# Patient Record
Sex: MALE | Race: WHITE | Employment: OTHER | URBAN - METROPOLITAN AREA
[De-identification: names, ages, dates, MRNs, and addresses within clinical notes are randomized per-mention and may not be internally consistent; named-entity substitution may affect disease eponyms.]

---

## 2019-08-05 ENCOUNTER — OFFICE VISIT (OUTPATIENT)
Dept: ORTHOPEDIC SURGERY | Age: 59
End: 2019-08-05
Payer: COMMERCIAL

## 2019-08-05 VITALS — WEIGHT: 177 LBS | HEIGHT: 66 IN | BODY MASS INDEX: 28.45 KG/M2

## 2019-08-05 DIAGNOSIS — M22.2X2 PATELLOFEMORAL PAIN SYNDROME OF LEFT KNEE: Primary | ICD-10-CM

## 2019-08-05 DIAGNOSIS — M25.562 LEFT KNEE PAIN, UNSPECIFIED CHRONICITY: ICD-10-CM

## 2019-08-05 PROCEDURE — 99243 OFF/OP CNSLTJ NEW/EST LOW 30: CPT | Performed by: ORTHOPAEDIC SURGERY

## 2019-08-05 PROCEDURE — G8427 DOCREV CUR MEDS BY ELIG CLIN: HCPCS | Performed by: ORTHOPAEDIC SURGERY

## 2019-08-05 PROCEDURE — G8419 CALC BMI OUT NRM PARAM NOF/U: HCPCS | Performed by: ORTHOPAEDIC SURGERY

## 2019-08-05 RX ORDER — METHOCARBAMOL 750 MG/1
750 TABLET, FILM COATED ORAL PRN
COMMUNITY
Start: 2019-07-05 | End: 2022-01-24

## 2019-08-05 RX ORDER — OMEPRAZOLE 20 MG/1
CAPSULE, DELAYED RELEASE ORAL
COMMUNITY
Start: 2019-06-14 | End: 2021-11-18

## 2019-08-05 RX ORDER — MELOXICAM 15 MG/1
TABLET ORAL
Refills: 0 | Status: ON HOLD | COMMUNITY
Start: 2019-05-04 | End: 2021-01-21 | Stop reason: ALTCHOICE

## 2019-08-05 RX ORDER — SODIUM PHOSPHATE,MONO-DIBASIC 19G-7G/118
1 ENEMA (ML) RECTAL
COMMUNITY

## 2021-01-05 NOTE — PROGRESS NOTES
The [facility], [employer] / [facility] ([facility]) [address] [facility] [address] [facility], [facility] [place] [address]    Acknowledgment of Informed Consent for Surgical or Medical Procedure and Sedation  I agree to allow doctor(s) [address] [facility] and his/her associates or assistants, including residents and/or other qualified medical practitioner to perform the following medical treatment or procedure and to administer or direct the administration of sedation as necessary:  Procedure(s): [address] [facility], [address]  My doctor has explained the following regarding the proposed procedure:   the explanation of the procedure   the benefits of the procedure   the potential problems that might occur during recuperation   the risks and side effects of the procedure which could include but are not limited to severe blood loss, infection, stroke or death   the benefits, risks and side effect of alternative procedures including the consequences of declining this procedure or any alternative procedures   the likelihood of achieving satisfactory results. I acknowledge no guarantee or assurance has been made to me regarding the results. I understand that during the course of this treatment/procedure, unforeseen conditions can occur which require an additional or different procedure. I agree to allow my physician or assistants to perform such extension of the original procedure as they may find necessary. I understand that sedation will often result in temporary impairment of memory and fine motor skills and that sedation can occasionally progress to a state of deep sedation or general anesthesia. I understand the risks of anesthesia for surgery include, but are not limited to, sore throat, hoarseness, injury to face, mouth, or teeth; nausea; headache; injury to blood vessels or nerves; death, brain damage, or paralysis.     I understand that if I have a Limitation of Treatment order in effect during my hospitalization, the order may or may not be in effect during this procedure. I give my doctor permission to give me blood or blood products. I understand that there are risks with receiving blood such as hepatitis, AIDS, fever, or allergic reaction. I acknowledge that the risks, benefits, and alternatives of this treatment have been explained to me and that no express or implied warranty has been given by the hospital, any blood bank, or any person or entity as to the blood or blood components transfused. At the discretion of my doctor, I agree to allow observers, equipment/product representatives and allow photographing, and/or televising of the procedure, provided my name or identity is maintained confidentially. I agree the hospital may dispose of or use for scientific or educational purposes any tissue, fluid, or body parts which may be removed.     ________________________________Date________Time______ am/pm  ([place])  Patient or Signature of Closest Relative or Legal Guardian    ________________________________Date________Time______am/pm      Page 1 of  1  Witness

## 2021-01-14 NOTE — PROGRESS NOTES
[address] [facility] patients having surgery or anesthesia are required to be Covid tested. You will need to quarantined from the time you are tested until your surgery. PRIOR TO PROCEDURE DATE:  1. Please follow any guidelines/instructions prior to your procedure as advised by your surgeon. 2. Arrange for someone to drive you home and be with you for the first 24 hours after discharge for your safety after your procedure for which you received sedation. Ensure it is someone we can share information with regarding your discharge. 3. You must contact your surgeon for instructions IF:   You are taking any blood thinners, aspirin, anti-inflammatory or vitamin E.   There is a change in your physical condition such as a cold, fever, rash, cuts, sores or any other infection, especially near your surgical site. 4. Do not drink alcohol the day before or day of your procedure. 5. A Pre-op History and Physical for surgery MUST be completed by your Physician or Urgent Care within 30 days of your procedure date. Please bring a copy with you on the day of your procedure and along with any other testing performed. THE DAY OF YOUR PROCEDURE:  1. Follow instructions for ARRIVAL TIME as DIRECTED BY YOUR SURGEON. I    2. Enter the MAIN entrance from [facility] and follow the signs to the free [employer] or [employer] parking (offered free of charge 6am-5pm). 3. Enter the Main Entrance of the hospital (do not enter from the lower level of the parking garage). Upon entrance, check in with the  at the main desk on your left. If no one is available at the desk, proceed into the [facility] Waiting Room and go through the door directly into the [facility]. There is a Check-in desk ACROSS from Room 5 (marked with a sign hanging from the ceiling). The phone number for the surgery center is [phone].     4. Please call [phone] option #2 option #2 if you have not been preregistered yet. On the day of your procedure bring your insurance card and photo ID. You will be registered at your bedside once brought back to your room. 5. DO NOT EAT ANYTHING eight hours prior to surgery. May have 8 ounces of water 4 hours prior to surgery. 6. MEDICATIONS    Take the following medications with a SMALL sip of water: NONE    Use your usual dose of inhalers the morning of surgery. BRING your rescue inhaler with you to hospital.    Anesthesia does NOT want you to take insulin the morning of surgery. They will control your blood sugar while you are at the hospital. Please contact your ordering physician for instructions regarding your insulin the night before your procedure. If you have an insulin pump, please keep it set on basal rate. 7. Do not swallow water when brushing teeth. No gum, candy, mints or ice chips. Refrain from smoking or at least decrease the amount. 8. Dress in loose, comfortable clothing appropriate for redressing after your procedure. Do not wear jewelry (including body piercings), make-up (especially NO eye make-up), fingernail polish (NO toenail polish if foot/leg surgery), lotion, powders or metal hairclips. 9. Dentures, glasses, or contacts will need to be removed before your procedure. Bring cases for your glasses, contacts, dentures, or hearing aids to protect them while you are in surgery. 10. If you use a CPAP, please bring it with you on the day of your procedure. 11. We recommend that valuable personal  belongings such as cash, cell phones, e-tablets or jewelry, be left at home during your stay. The hospital will not be responsible for valuables that are not secured in the hospital safe. However, if your insurance requires a co-pay, you may want to bring a method of payment, i.e. Check or credit card, if you wish to pay your co-pay the day of surgery.       12. If you are in your vital signs or breathing patterns. Nausea, headache, muscle aches, or sore throat may also occur after anesthesia. Your nurse will help you manage these potential side effects. 2. For comfort and safety, arrange to have someone at home with you for the first 24 hours after discharge. 3. You and your family will be given written instructions about your diet, activity, dressing care, medications, and return visits. 4. Once at home, should issues with nausea, pain, or bleeding occur, or should you notice any signs of infection, you should call your surgeon. 5. Always clean your hands before and after caring for your wound. Do not let your family touch your surgery site without cleaning their hands. 6. Narcotic pain medications can cause significant constipation. You may want to add a stool softener to your postoperative medication schedule or speak to your surgeon on how best to manage this SIDE EFFECT. SPECIAL INSTRUCTIONS    Thank you for allowing us to care for you. We strive to exceed your expectations in the delivery of care and service provided to you and your family. If you need to contact us for any reason, please call us at [phone]    Instructions reviewed with patient during preadmission testing phone interview. Read Border. 1/14/2021 .11:32 AM      ADDITIONAL EDUCATIONAL INFORMATION REVIEWED PER PHONE WITH YOU AND/OR YOUR FAMILY:  No Bring a urine sample on day of surgery  No Hibiclens® Bathing Instructions   Yes Antibacterial Soap

## 2021-01-15 ENCOUNTER — NURSE ONLY (OUTPATIENT)
Dept: PRIMARY CARE CLINIC | Age: 61
End: 2021-01-15
Payer: COMMERCIAL

## 2021-01-15 DIAGNOSIS — Z01.818 PREOP EXAMINATION: Primary | ICD-10-CM

## 2021-01-15 PROCEDURE — 99211 OFF/OP EST MAY X REQ PHY/QHP: CPT | Performed by: NURSE PRACTITIONER

## 2021-01-16 LAB — SARS-COV-2: NOT DETECTED

## 2021-01-19 ENCOUNTER — ANESTHESIA EVENT (OUTPATIENT)
Dept: OPERATING ROOM | Age: 61
End: 2021-01-19
Payer: COMMERCIAL

## 2021-01-21 ENCOUNTER — ANESTHESIA (OUTPATIENT)
Dept: OPERATING ROOM | Age: 61
End: 2021-01-21
Payer: COMMERCIAL

## 2021-01-21 ENCOUNTER — HOSPITAL ENCOUNTER (OUTPATIENT)
Age: 61
Setting detail: OBSERVATION
Discharge: HOME | End: 2021-01-22
Attending: UROLOGY | Admitting: UROLOGY
Payer: COMMERCIAL

## 2021-01-21 VITALS — SYSTOLIC BLOOD PRESSURE: 123 MMHG | DIASTOLIC BLOOD PRESSURE: 76 MMHG | TEMPERATURE: 97.3 F | OXYGEN SATURATION: 84 %

## 2021-01-21 DIAGNOSIS — C61 MALIGNANT NEOPLASM OF PROSTATE (HCC): ICD-10-CM

## 2021-01-21 LAB
ABO/RH: NORMAL
ANTIBODY SCREEN: NORMAL
GLUCOSE BLD-MCNC: 84 MG/DL (ref 70–99)
PERFORMED ON: NORMAL

## 2021-01-21 PROCEDURE — 86900 BLOOD TYPING SEROLOGIC ABO: CPT

## 2021-01-21 PROCEDURE — 2500000003 HC RX 250 WO HCPCS: Performed by: UROLOGY

## 2021-01-21 PROCEDURE — 2580000003 HC RX 258: Performed by: UROLOGY

## 2021-01-21 PROCEDURE — 88342 IMHCHEM/IMCYTCHM 1ST ANTB: CPT

## 2021-01-21 PROCEDURE — 6360000002 HC RX W HCPCS: Performed by: UROLOGY

## 2021-01-21 PROCEDURE — 6370000000 HC RX 637 (ALT 250 FOR IP): Performed by: UROLOGY

## 2021-01-21 PROCEDURE — 7100000001 HC PACU RECOVERY - ADDTL 15 MIN: Performed by: UROLOGY

## 2021-01-21 PROCEDURE — 88341 IMHCHEM/IMCYTCHM EA ADD ANTB: CPT

## 2021-01-21 PROCEDURE — 3700000001 HC ADD 15 MINUTES (ANESTHESIA): Performed by: UROLOGY

## 2021-01-21 PROCEDURE — 3600000019 HC SURGERY ROBOT ADDTL 15MIN: Performed by: UROLOGY

## 2021-01-21 PROCEDURE — 88309 TISSUE EXAM BY PATHOLOGIST: CPT

## 2021-01-21 PROCEDURE — 3700000000 HC ANESTHESIA ATTENDED CARE: Performed by: UROLOGY

## 2021-01-21 PROCEDURE — 2720000010 HC SURG SUPPLY STERILE: Performed by: UROLOGY

## 2021-01-21 PROCEDURE — 2580000003 HC RX 258: Performed by: ANESTHESIOLOGY

## 2021-01-21 PROCEDURE — 86901 BLOOD TYPING SEROLOGIC RH(D): CPT

## 2021-01-21 PROCEDURE — 7100000000 HC PACU RECOVERY - FIRST 15 MIN: Performed by: UROLOGY

## 2021-01-21 PROCEDURE — 2709999900 HC NON-CHARGEABLE SUPPLY: Performed by: UROLOGY

## 2021-01-21 PROCEDURE — 3600000009 HC SURGERY ROBOT BASE: Performed by: UROLOGY

## 2021-01-21 PROCEDURE — 6360000002 HC RX W HCPCS: Performed by: NURSE ANESTHETIST, CERTIFIED REGISTERED

## 2021-01-21 PROCEDURE — 86850 RBC ANTIBODY SCREEN: CPT

## 2021-01-21 PROCEDURE — G0378 HOSPITAL OBSERVATION PER HR: HCPCS

## 2021-01-21 PROCEDURE — S2900 ROBOTIC SURGICAL SYSTEM: HCPCS | Performed by: UROLOGY

## 2021-01-21 PROCEDURE — 1200000000 HC SEMI PRIVATE

## 2021-01-21 PROCEDURE — 2500000003 HC RX 250 WO HCPCS: Performed by: NURSE ANESTHETIST, CERTIFIED REGISTERED

## 2021-01-21 RX ORDER — GLUCOSAMINE HCL 500 MG
TABLET ORAL
COMMUNITY

## 2021-01-21 RX ORDER — KETOROLAC TROMETHAMINE 30 MG/ML
INJECTION, SOLUTION INTRAMUSCULAR; INTRAVENOUS PRN
Status: DISCONTINUED | OUTPATIENT
Start: 2021-01-21 | End: 2021-01-21 | Stop reason: SDUPTHER

## 2021-01-21 RX ORDER — PROMETHAZINE HYDROCHLORIDE 25 MG/1
12.5 TABLET ORAL EVERY 6 HOURS PRN
Status: DISCONTINUED | OUTPATIENT
Start: 2021-01-21 | End: 2021-01-22 | Stop reason: HOSPADM

## 2021-01-21 RX ORDER — SODIUM CHLORIDE, SODIUM LACTATE, POTASSIUM CHLORIDE, CALCIUM CHLORIDE 600; 310; 30; 20 MG/100ML; MG/100ML; MG/100ML; MG/100ML
INJECTION, SOLUTION INTRAVENOUS CONTINUOUS
Status: DISCONTINUED | OUTPATIENT
Start: 2021-01-21 | End: 2021-01-22 | Stop reason: HOSPADM

## 2021-01-21 RX ORDER — BUPIVACAINE HYDROCHLORIDE 5 MG/ML
INJECTION, SOLUTION EPIDURAL; INTRACAUDAL PRN
Status: DISCONTINUED | OUTPATIENT
Start: 2021-01-21 | End: 2021-01-21 | Stop reason: ALTCHOICE

## 2021-01-21 RX ORDER — NAPROXEN 500 MG/1
500 TABLET ORAL 2 TIMES DAILY WITH MEALS
COMMUNITY
Start: 2020-11-19

## 2021-01-21 RX ORDER — HYDROMORPHONE HCL 110MG/55ML
PATIENT CONTROLLED ANALGESIA SYRINGE INTRAVENOUS PRN
Status: DISCONTINUED | OUTPATIENT
Start: 2021-01-21 | End: 2021-01-21 | Stop reason: SDUPTHER

## 2021-01-21 RX ORDER — FENTANYL CITRATE 50 UG/ML
INJECTION, SOLUTION INTRAMUSCULAR; INTRAVENOUS PRN
Status: DISCONTINUED | OUTPATIENT
Start: 2021-01-21 | End: 2021-01-21 | Stop reason: SDUPTHER

## 2021-01-21 RX ORDER — PROMETHAZINE HYDROCHLORIDE 25 MG/ML
6.25 INJECTION, SOLUTION INTRAMUSCULAR; INTRAVENOUS
Status: DISCONTINUED | OUTPATIENT
Start: 2021-01-21 | End: 2021-01-21 | Stop reason: HOSPADM

## 2021-01-21 RX ORDER — OXYCODONE HYDROCHLORIDE 5 MG/1
5 TABLET ORAL EVERY 4 HOURS PRN
Status: DISCONTINUED | OUTPATIENT
Start: 2021-01-21 | End: 2021-01-22 | Stop reason: HOSPADM

## 2021-01-21 RX ORDER — CEFAZOLIN SODIUM 2 G/50ML
2000 SOLUTION INTRAVENOUS EVERY 8 HOURS
Status: COMPLETED | OUTPATIENT
Start: 2021-01-21 | End: 2021-01-21

## 2021-01-21 RX ORDER — ACETAMINOPHEN 325 MG/1
650 TABLET ORAL EVERY 6 HOURS
Status: DISCONTINUED | OUTPATIENT
Start: 2021-01-21 | End: 2021-01-22 | Stop reason: HOSPADM

## 2021-01-21 RX ORDER — EPHEDRINE SULFATE 50 MG/ML
INJECTION INTRAVENOUS PRN
Status: DISCONTINUED | OUTPATIENT
Start: 2021-01-21 | End: 2021-01-21 | Stop reason: SDUPTHER

## 2021-01-21 RX ORDER — METHOCARBAMOL 750 MG/1
750 TABLET, FILM COATED ORAL 3 TIMES DAILY
Status: DISCONTINUED | OUTPATIENT
Start: 2021-01-21 | End: 2021-01-21

## 2021-01-21 RX ORDER — POLYETHYLENE GLYCOL 3350 17 G/17G
17 POWDER, FOR SOLUTION ORAL DAILY
Status: DISCONTINUED | OUTPATIENT
Start: 2021-01-21 | End: 2021-01-22 | Stop reason: HOSPADM

## 2021-01-21 RX ORDER — OXYCODONE HYDROCHLORIDE 5 MG/1
5 TABLET ORAL PRN
Status: DISCONTINUED | OUTPATIENT
Start: 2021-01-21 | End: 2021-01-21 | Stop reason: HOSPADM

## 2021-01-21 RX ORDER — DICYCLOMINE HYDROCHLORIDE 10 MG/1
10 CAPSULE ORAL 3 TIMES DAILY
Status: DISCONTINUED | OUTPATIENT
Start: 2021-01-21 | End: 2021-01-22 | Stop reason: HOSPADM

## 2021-01-21 RX ORDER — SODIUM CHLORIDE, SODIUM LACTATE, POTASSIUM CHLORIDE, CALCIUM CHLORIDE 600; 310; 30; 20 MG/100ML; MG/100ML; MG/100ML; MG/100ML
INJECTION, SOLUTION INTRAVENOUS CONTINUOUS
Status: DISCONTINUED | OUTPATIENT
Start: 2021-01-21 | End: 2021-01-21

## 2021-01-21 RX ORDER — SODIUM CHLORIDE 0.9 % (FLUSH) 0.9 %
10 SYRINGE (ML) INJECTION PRN
Status: DISCONTINUED | OUTPATIENT
Start: 2021-01-21 | End: 2021-01-22 | Stop reason: HOSPADM

## 2021-01-21 RX ORDER — MAGNESIUM HYDROXIDE 1200 MG/15ML
LIQUID ORAL CONTINUOUS PRN
Status: COMPLETED | OUTPATIENT
Start: 2021-01-21 | End: 2021-01-21

## 2021-01-21 RX ORDER — ATROPA BELLADONNA AND OPIUM 16.2; 6 MG/1; MG/1
SUPPOSITORY RECTAL PRN
Status: DISCONTINUED | OUTPATIENT
Start: 2021-01-21 | End: 2021-01-21 | Stop reason: ALTCHOICE

## 2021-01-21 RX ORDER — MORPHINE SULFATE 2 MG/ML
2 INJECTION, SOLUTION INTRAMUSCULAR; INTRAVENOUS
Status: DISCONTINUED | OUTPATIENT
Start: 2021-01-21 | End: 2021-01-22 | Stop reason: HOSPADM

## 2021-01-21 RX ORDER — MORPHINE SULFATE 4 MG/ML
1 INJECTION, SOLUTION INTRAMUSCULAR; INTRAVENOUS EVERY 5 MIN PRN
Status: DISCONTINUED | OUTPATIENT
Start: 2021-01-21 | End: 2021-01-21 | Stop reason: HOSPADM

## 2021-01-21 RX ORDER — ONDANSETRON 2 MG/ML
INJECTION INTRAMUSCULAR; INTRAVENOUS PRN
Status: DISCONTINUED | OUTPATIENT
Start: 2021-01-21 | End: 2021-01-21 | Stop reason: SDUPTHER

## 2021-01-21 RX ORDER — PANTOPRAZOLE SODIUM 40 MG/1
40 TABLET, DELAYED RELEASE ORAL
Status: DISCONTINUED | OUTPATIENT
Start: 2021-01-22 | End: 2021-01-21

## 2021-01-21 RX ORDER — ONDANSETRON 2 MG/ML
4 INJECTION INTRAMUSCULAR; INTRAVENOUS EVERY 6 HOURS PRN
Status: DISCONTINUED | OUTPATIENT
Start: 2021-01-21 | End: 2021-01-22 | Stop reason: HOSPADM

## 2021-01-21 RX ORDER — OXYCODONE HYDROCHLORIDE 5 MG/1
10 TABLET ORAL PRN
Status: DISCONTINUED | OUTPATIENT
Start: 2021-01-21 | End: 2021-01-21 | Stop reason: HOSPADM

## 2021-01-21 RX ORDER — HYDRALAZINE HYDROCHLORIDE 20 MG/ML
5 INJECTION INTRAMUSCULAR; INTRAVENOUS EVERY 10 MIN PRN
Status: DISCONTINUED | OUTPATIENT
Start: 2021-01-21 | End: 2021-01-21 | Stop reason: HOSPADM

## 2021-01-21 RX ORDER — METOCLOPRAMIDE HYDROCHLORIDE 5 MG/ML
INJECTION INTRAMUSCULAR; INTRAVENOUS PRN
Status: DISCONTINUED | OUTPATIENT
Start: 2021-01-21 | End: 2021-01-21 | Stop reason: SDUPTHER

## 2021-01-21 RX ORDER — DIPHENHYDRAMINE HYDROCHLORIDE 50 MG/ML
12.5 INJECTION INTRAMUSCULAR; INTRAVENOUS
Status: DISCONTINUED | OUTPATIENT
Start: 2021-01-21 | End: 2021-01-21 | Stop reason: HOSPADM

## 2021-01-21 RX ORDER — METOCLOPRAMIDE HYDROCHLORIDE 5 MG/ML
10 INJECTION INTRAMUSCULAR; INTRAVENOUS
Status: DISCONTINUED | OUTPATIENT
Start: 2021-01-21 | End: 2021-01-21 | Stop reason: HOSPADM

## 2021-01-21 RX ORDER — CIPROFLOXACIN 2 MG/ML
400 INJECTION, SOLUTION INTRAVENOUS
Status: COMPLETED | OUTPATIENT
Start: 2021-01-21 | End: 2021-01-21

## 2021-01-21 RX ORDER — GLYCOPYRROLATE 0.2 MG/ML
INJECTION INTRAMUSCULAR; INTRAVENOUS PRN
Status: DISCONTINUED | OUTPATIENT
Start: 2021-01-21 | End: 2021-01-21 | Stop reason: SDUPTHER

## 2021-01-21 RX ORDER — SODIUM CHLORIDE 0.9 % (FLUSH) 0.9 %
10 SYRINGE (ML) INJECTION EVERY 12 HOURS SCHEDULED
Status: DISCONTINUED | OUTPATIENT
Start: 2021-01-21 | End: 2021-01-22 | Stop reason: HOSPADM

## 2021-01-21 RX ORDER — MAGNESIUM HYDROXIDE 1200 MG/15ML
LIQUID ORAL PRN
Status: DISCONTINUED | OUTPATIENT
Start: 2021-01-21 | End: 2021-01-21 | Stop reason: ALTCHOICE

## 2021-01-21 RX ORDER — MORPHINE SULFATE 2 MG/ML
4 INJECTION, SOLUTION INTRAMUSCULAR; INTRAVENOUS
Status: DISCONTINUED | OUTPATIENT
Start: 2021-01-21 | End: 2021-01-22 | Stop reason: HOSPADM

## 2021-01-21 RX ORDER — MEPERIDINE HYDROCHLORIDE 25 MG/ML
12.5 INJECTION INTRAMUSCULAR; INTRAVENOUS; SUBCUTANEOUS EVERY 5 MIN PRN
Status: DISCONTINUED | OUTPATIENT
Start: 2021-01-21 | End: 2021-01-21 | Stop reason: HOSPADM

## 2021-01-21 RX ORDER — LIDOCAINE HYDROCHLORIDE 20 MG/ML
JELLY TOPICAL PRN
Status: DISCONTINUED | OUTPATIENT
Start: 2021-01-21 | End: 2021-01-21 | Stop reason: ALTCHOICE

## 2021-01-21 RX ORDER — PROPOFOL 10 MG/ML
INJECTION, EMULSION INTRAVENOUS PRN
Status: DISCONTINUED | OUTPATIENT
Start: 2021-01-21 | End: 2021-01-21 | Stop reason: SDUPTHER

## 2021-01-21 RX ORDER — LIDOCAINE HYDROCHLORIDE 20 MG/ML
INJECTION, SOLUTION INTRAVENOUS PRN
Status: DISCONTINUED | OUTPATIENT
Start: 2021-01-21 | End: 2021-01-21 | Stop reason: SDUPTHER

## 2021-01-21 RX ORDER — OXYCODONE HYDROCHLORIDE 5 MG/1
10 TABLET ORAL EVERY 4 HOURS PRN
Status: DISCONTINUED | OUTPATIENT
Start: 2021-01-21 | End: 2021-01-22 | Stop reason: HOSPADM

## 2021-01-21 RX ORDER — BISACODYL 10 MG
10 SUPPOSITORY, RECTAL RECTAL DAILY PRN
Status: DISCONTINUED | OUTPATIENT
Start: 2021-01-21 | End: 2021-01-22 | Stop reason: HOSPADM

## 2021-01-21 RX ORDER — LABETALOL 20 MG/4 ML (5 MG/ML) INTRAVENOUS SYRINGE
5 EVERY 10 MIN PRN
Status: DISCONTINUED | OUTPATIENT
Start: 2021-01-21 | End: 2021-01-21 | Stop reason: HOSPADM

## 2021-01-21 RX ORDER — ROCURONIUM BROMIDE 10 MG/ML
INJECTION, SOLUTION INTRAVENOUS PRN
Status: DISCONTINUED | OUTPATIENT
Start: 2021-01-21 | End: 2021-01-21 | Stop reason: SDUPTHER

## 2021-01-21 RX ORDER — DICYCLOMINE HYDROCHLORIDE 10 MG/1
10 CAPSULE ORAL 3 TIMES DAILY
COMMUNITY
Start: 2020-08-20

## 2021-01-21 RX ADMIN — PROPOFOL 50 MG: 10 INJECTION, EMULSION INTRAVENOUS at 10:30

## 2021-01-21 RX ADMIN — EPHEDRINE SULFATE 15 MG: 50 INJECTION INTRAVENOUS at 08:08

## 2021-01-21 RX ADMIN — HYDROMORPHONE HYDROCHLORIDE 0.8 MG: 2 INJECTION, SOLUTION INTRAMUSCULAR; INTRAVENOUS; SUBCUTANEOUS at 08:28

## 2021-01-21 RX ADMIN — SODIUM CHLORIDE, POTASSIUM CHLORIDE, SODIUM LACTATE AND CALCIUM CHLORIDE: 600; 310; 30; 20 INJECTION, SOLUTION INTRAVENOUS at 05:55

## 2021-01-21 RX ADMIN — CIPROFLOXACIN 400 MG: 2 INJECTION, SOLUTION INTRAVENOUS at 07:58

## 2021-01-21 RX ADMIN — ACETAMINOPHEN 650 MG: 325 TABLET ORAL at 22:04

## 2021-01-21 RX ADMIN — CEFAZOLIN SODIUM 2000 MG: 2 SOLUTION INTRAVENOUS at 20:16

## 2021-01-21 RX ADMIN — SUGAMMADEX 200 MG: 100 INJECTION, SOLUTION INTRAVENOUS at 10:28

## 2021-01-21 RX ADMIN — LIDOCAINE HYDROCHLORIDE 100 MG: 20 INJECTION, SOLUTION INTRAVENOUS at 07:38

## 2021-01-21 RX ADMIN — ROCURONIUM BROMIDE 100 MG: 10 INJECTION INTRAVENOUS at 07:39

## 2021-01-21 RX ADMIN — HYDROMORPHONE HYDROCHLORIDE 0.8 MG: 2 INJECTION, SOLUTION INTRAMUSCULAR; INTRAVENOUS; SUBCUTANEOUS at 08:19

## 2021-01-21 RX ADMIN — GLYCOPYRROLATE 0.3 MG: 0.2 INJECTION INTRAMUSCULAR; INTRAVENOUS at 08:06

## 2021-01-21 RX ADMIN — GLYCOPYRROLATE 0.2 MG: 0.2 INJECTION INTRAMUSCULAR; INTRAVENOUS at 09:27

## 2021-01-21 RX ADMIN — KETOROLAC TROMETHAMINE 30 MG: 30 INJECTION, SOLUTION INTRAMUSCULAR; INTRAVENOUS at 07:58

## 2021-01-21 RX ADMIN — SODIUM CHLORIDE, POTASSIUM CHLORIDE, SODIUM LACTATE AND CALCIUM CHLORIDE: 600; 310; 30; 20 INJECTION, SOLUTION INTRAVENOUS at 08:23

## 2021-01-21 RX ADMIN — HYDROMORPHONE HYDROCHLORIDE 0.4 MG: 2 INJECTION, SOLUTION INTRAMUSCULAR; INTRAVENOUS; SUBCUTANEOUS at 09:27

## 2021-01-21 RX ADMIN — FAMOTIDINE 20 MG: 10 INJECTION, SOLUTION INTRAVENOUS at 07:36

## 2021-01-21 RX ADMIN — GLYCOPYRROLATE 0.2 MG: 0.2 INJECTION INTRAMUSCULAR; INTRAVENOUS at 07:55

## 2021-01-21 RX ADMIN — METOCLOPRAMIDE 10 MG: 5 INJECTION, SOLUTION INTRAMUSCULAR; INTRAVENOUS at 07:39

## 2021-01-21 RX ADMIN — DICYCLOMINE HYDROCHLORIDE 10 MG: 10 CAPSULE ORAL at 20:16

## 2021-01-21 RX ADMIN — PROPOFOL 180 MG: 10 INJECTION, EMULSION INTRAVENOUS at 07:38

## 2021-01-21 RX ADMIN — FENTANYL CITRATE 100 MCG: 50 INJECTION INTRAMUSCULAR; INTRAVENOUS at 08:04

## 2021-01-21 RX ADMIN — SODIUM CHLORIDE, POTASSIUM CHLORIDE, SODIUM LACTATE AND CALCIUM CHLORIDE: 600; 310; 30; 20 INJECTION, SOLUTION INTRAVENOUS at 06:12

## 2021-01-21 RX ADMIN — CEFAZOLIN SODIUM 2000 MG: 2 SOLUTION INTRAVENOUS at 12:10

## 2021-01-21 RX ADMIN — ONDANSETRON 4 MG: 2 INJECTION INTRAMUSCULAR; INTRAVENOUS at 07:40

## 2021-01-21 RX ADMIN — PROPOFOL 50 MG: 10 INJECTION, EMULSION INTRAVENOUS at 10:28

## 2021-01-21 RX ADMIN — METRONIDAZOLE 500 MG: 500 INJECTION, SOLUTION INTRAVENOUS at 07:36

## 2021-01-21 RX ADMIN — DICYCLOMINE HYDROCHLORIDE 10 MG: 10 CAPSULE ORAL at 16:01

## 2021-01-21 RX ADMIN — PHENYLEPHRINE HYDROCHLORIDE 100 MCG: 10 INJECTION, SOLUTION INTRAMUSCULAR; INTRAVENOUS; SUBCUTANEOUS at 08:14

## 2021-01-21 ASSESSMENT — PULMONARY FUNCTION TESTS
PIF_VALUE: 18
PIF_VALUE: 32
PIF_VALUE: 30
PIF_VALUE: 32
PIF_VALUE: 31
PIF_VALUE: 27
PIF_VALUE: 30
PIF_VALUE: 36
PIF_VALUE: 34
PIF_VALUE: 32
PIF_VALUE: 36
PIF_VALUE: 32
PIF_VALUE: 33
PIF_VALUE: 17
PIF_VALUE: 34
PIF_VALUE: 29
PIF_VALUE: 32
PIF_VALUE: 17
PIF_VALUE: 16
PIF_VALUE: 18
PIF_VALUE: 17
PIF_VALUE: 31
PIF_VALUE: 32
PIF_VALUE: 17
PIF_VALUE: 27
PIF_VALUE: 31
PIF_VALUE: 1
PIF_VALUE: 17
PIF_VALUE: 32
PIF_VALUE: 17
PIF_VALUE: 15
PIF_VALUE: 32
PIF_VALUE: 17
PIF_VALUE: 36
PIF_VALUE: 35
PIF_VALUE: 17
PIF_VALUE: 34
PIF_VALUE: 34
PIF_VALUE: 31
PIF_VALUE: 32
PIF_VALUE: 22
PIF_VALUE: 31
PIF_VALUE: 32
PIF_VALUE: 1
PIF_VALUE: 31
PIF_VALUE: 30
PIF_VALUE: 16
PIF_VALUE: 1
PIF_VALUE: 23
PIF_VALUE: 18
PIF_VALUE: 31
PIF_VALUE: 5
PIF_VALUE: 32
PIF_VALUE: 26
PIF_VALUE: 30
PIF_VALUE: 31
PIF_VALUE: 31
PIF_VALUE: 32
PIF_VALUE: 17
PIF_VALUE: 31
PIF_VALUE: 35
PIF_VALUE: 31
PIF_VALUE: 17
PIF_VALUE: 29
PIF_VALUE: 15
PIF_VALUE: 31
PIF_VALUE: 19
PIF_VALUE: 17
PIF_VALUE: 36
PIF_VALUE: 22
PIF_VALUE: 29
PIF_VALUE: 33
PIF_VALUE: 18
PIF_VALUE: 1
PIF_VALUE: 32
PIF_VALUE: 30
PIF_VALUE: 25
PIF_VALUE: 31
PIF_VALUE: 17
PIF_VALUE: 31
PIF_VALUE: 30
PIF_VALUE: 33
PIF_VALUE: 17
PIF_VALUE: 17
PIF_VALUE: 34
PIF_VALUE: 31
PIF_VALUE: 2
PIF_VALUE: 31
PIF_VALUE: 26
PIF_VALUE: 29
PIF_VALUE: 30
PIF_VALUE: 17
PIF_VALUE: 31
PIF_VALUE: 17
PIF_VALUE: 32
PIF_VALUE: 24
PIF_VALUE: 17
PIF_VALUE: 30
PIF_VALUE: 31
PIF_VALUE: 17
PIF_VALUE: 35
PIF_VALUE: 35
PIF_VALUE: 32
PIF_VALUE: 17
PIF_VALUE: 31
PIF_VALUE: 17
PIF_VALUE: 31
PIF_VALUE: 30
PIF_VALUE: 30

## 2021-01-21 ASSESSMENT — PAIN SCALES - GENERAL
PAINLEVEL_OUTOF10: 3
PAINLEVEL_OUTOF10: 0

## 2021-01-21 ASSESSMENT — PAIN - FUNCTIONAL ASSESSMENT: PAIN_FUNCTIONAL_ASSESSMENT: 0-10

## 2021-01-21 ASSESSMENT — PAIN DESCRIPTION - DESCRIPTORS: DESCRIPTORS: THROBBING;ACHING

## 2021-01-21 NOTE — PROGRESS NOTES
PACU Transfer to Floor Note    Current Allergies: Clindamycin, Doxycycline, and Prednisone    Pt meets criteria as per Judit Score and ASPAN Standards to transfer to next phase of care. Recent Labs     01/21/21  0559   POCGLU 84       Vitals:    01/21/21 1315   BP: 132/84   Pulse: 56   Resp: 11   Temp: 97.2   SpO2: 97%     Vitals within 20% of pt's admission vitals as per JUDIT SCORE    SpO2: 97 %    O2 Flow Rate (L/min): 2 L/min      Intake/Output Summary (Last 24 hours) at 1/21/2021 1359  Last data filed at 1/21/2021 1352  Gross per 24 hour   Intake 1400 ml   Output 395 ml   Net 1005 ml       Pain assessment:  level of pain (1-10, 10 severe),     Pain Level: 0      Handoff report given at bedside.    Family updated and directed to pt room      1/21/2021 1:59 PM

## 2021-01-21 NOTE — PROGRESS NOTES
General Surgery Post-Op Note  Name: Bashir Dumont  Procedure: Malignant neoplasm of the prostate s/p robotic prostatectomy    Subjective:  Patient is appropriately sore from surgery but otherwise is doing well. States that he is thirsty. Tolerating sips of water without nausea and vomiting. Santana catheter in place. ROS: A 14 point review of systems was conducted, significant findings as noted in HPI. Objective:  Vitals:    01/21/21 1315 01/21/21 1330 01/21/21 1345 01/21/21 1415   BP: 132/84 131/81 (!) 129/91 136/86   Pulse: 56 56 58 59   Resp: 11 10 10 17   Temp:   97.2 °F (36.2 °C) 97.7 °F (36.5 °C)   TempSrc:   Temporal Axillary   SpO2: 97% 96% 94% 94%   Weight:       Height:             Intake/Output Summary (Last 24 hours) at 1/21/2021 1537  Last data filed at 1/21/2021 1352  Gross per 24 hour   Intake 1400 ml   Output 395 ml   Net 1005 ml       IntraOp Crystalloid 400cc    EBL 100cc    U/O 150cc   PostOp U/O  145cc    Drains NA            Continuous Infusions:   lactated ringers 100 mL/hr at 01/21/21 0555    lactated ringers 100 mL/hr at 01/21/21 9378    lactated ringers         Active Problems:    Prostate cancer [facility])  Resolved Problems:    * No resolved hospital problems. *       Physical Exam:/86   Pulse 59   Temp 97.7 °F (36.5 °C) (Axillary)   Resp 17   Ht 5' 6\" (1.676 m)   Wt 170 lb (77.1 kg)   SpO2 94%   BMI 27.44 kg/m²   Post-op vital signs:  Stable   Exam:General appearance: alert, appears stated age and cooperative  Lungs: clear to auscultation bilaterally, on RA  Heart: regular rate and rhythm, well perfused  Abdomen: soft, appropriately tender; incisions on abdomen clean dry and intact, well approximated, no erythema, no induration covered in   Dermabond surgical glue.    : santana in place - clear yellow urine  Extremities: no edema or cyanosis    Assessment and Plan  Bashir Dumont is a 56y.o. year old male with a malignant neoplasm of the prostate s/p robotic prostatectomy 1/21. POD#1.      Pain management: Scheduled Tylenol, Morphine and Oxycodone pain panel PRN  Cardiovasc: hemodynamically stable, will continue to monitor  Respiratory:  IS ordered to bedside, encourage hourly IS and deep breathing  FeNa: Fluids  LR @ 75 cc/hr, Diet: General  :  Urine output is adequate, continue santana catheter  Ambulation: OOB to chair, encourage ambulation  Prophylaxis: SCDs, lovenox  Abx: Ancef q8 hours      Nanette Gallegos MD  General Surgery, PGY1  01/21/21  3:37 PM  [phone]

## 2021-01-21 NOTE — H&P
[address] [place] [address]    [phone]    [facility] Same Day Surgery Update H & P  Department of General Surgery   Surgical Service   Pre-operative History and Physical  Last H & P within the last 30 days. DIAGNOSIS:   Malignant neoplasm of prostate ([address] [C61]    Procedure(s):  DAVINCI RADICAL ROBOTIC PROSTATECTOMY, BILATERAL NERVE SPARING, BILATERAL LYMPH NODE DISSECTION     HISTORY OF PRESENT ILLNESS:   Patient has prostate cancer and is here for a prostatectomy. Covid 19:  Patient denies fever, chills, cough or known exposure to Covid-19.        Past Medical History:        Diagnosis Date    Arthritis     KNEES    Hinojosa esophagus     Cancer ([address]     PROSTATE, AND THROAT    C[facility]     Diabetes mellitus (HCC)     PRE-DIABETIC    GERD (gastroesophageal reflux disease)     Hyperlipidemia     IBS (irritable bowel syndrome)     Infantile cerebral palsy (HCC)     MRSA (methicillin resistant staph aureus) culture positive     5 YRS AGO IN  ARM RT ARM    PONV (postoperative nausea and vomiting)     as child \"ether     Past Surgical History:        Procedure Laterality Date    ACHILLES TENDON SURGERY      COLONOSCOPY      ENDOSCOPY, COLON, DIAGNOSTIC      HAND SURGERY      LEFT HAND TUMOR REMOVED    KNEE SURGERY Bilateral     TONSILLECTOMY       Past Social History:  Social History     Socioeconomic History    Marital status: Single     Spouse name: None    Number of children: None    Years of education: None    Highest education level: None   Occupational History    None   Social Needs    Financial resource strain: None    Food insecurity     Worry: None     Inability: None    Transportation needs     Medical: None     Non-medical: None   Tobacco Use    Smoking status: Never Smoker    Smokeless tobacco: Never Used   Substance and Sexual Activity    Alcohol use: Not Currently    Drug use: Never    Sexual activity: None   Lifestyle    Physical activity     Days per week: None     Minutes per session: None    Stress: None   Relationships    Social connections     Talks on phone: None     Gets together: None     Attends Bahai service: None     Active member of club or organization: None     Attends meetings of clubs or organizations: None     Relationship status: None    Intimate partner violence     Fear of current or ex partner: None     Emotionally abused: None     Physically abused: None     Forced sexual activity: None   Other Topics Concern    None   Social History Narrative    None         Medications Prior to Admission:      Prior to Admission medications    Medication Sig Start Date End Date Taking?  Authorizing Provider   dicyclomine (BENTYL) 10 MG capsule Take 10 mg by mouth 3 times daily 8/20/20  Yes Historical Provider, MD   naproxen (NAPROSYN) 500 MG tablet Take 500 mg by mouth 2 times daily (with meals) 11/19/20  Yes Historical Provider, MD   glucosamine-chondroitin 500-400 MG CAPS Take 1 capsule by mouth   Yes Historical Provider, MD   MAGNESIUM PO Take by mouth    Historical Provider, MD   Menaquinone-7 (VITAMIN K2 PO) Take by mouth    Historical Provider, MD   OLIVE LEAF EXTRACT PO Take by mouth    Historical Provider, MD   Cholecalciferol (VITAMIN D3) 75 MCG (3000 UT) TABS Take by mouth    Historical Provider, MD   diclofenac sodium 1 % GEL APPLY 2 GRAMS TO THE AFFECTED AREAS 4 TIMES A DAY 6/1/19   Historical Provider, MD   methocarbamol (ROBAXIN) 750 MG tablet Take 750 mg by mouth as needed  7/5/19   Historical Provider, MD   omeprazole (PRILOSEC) 20 MG delayed release capsule TAKE 1 CAPSULE BY MOUTH EVERY DAY IN THE MORNING BEFORE BREAKFAST 6/14/19   Historical Provider, MD         Allergies:  Clindamycin, Doxycycline, and Prednisone    PHYSICAL EXAM:      BP (!) 170/87   Pulse 55   Temp 98.5 °F (36.9 °C) (Oral)   Resp 18   Ht 5' 6\" (1.676 m)   Wt 170 lb (77.1 kg)   SpO2 93%   BMI 27.44 kg/m²      Airway:  Airway patent with no audible

## 2021-01-21 NOTE — PROGRESS NOTES
Patient received from the OR to pACU #9 post [address] of Dr. Samaria Peres. Placed on PACU monitoring equipment. Report given per CRNA and Dr. Cecil Benson. Per report, patient required small amount of BP support, otherwise was stable intra op. On arrival, patient is arouseable, but still sleepy from surgery. Denies pain. Villar intact and draining cherry colored urine.

## 2021-01-21 NOTE — BRIEF OP NOTE
Brief Postoperative Note      Patient: Lia Pyleelor  YOB: 1960  MRN: [id number]    Date of Procedure: 1/21/2021    Pre-Op Diagnosis: Malignant neoplasm of prostate ([address] Mayra Pages    Post-Op Diagnosis: Same       Procedure(s):  DAVINCI ROBOTIC PROSTATECTOMY    Surgeon(s):  Mike Wesley MD    Assistant:  Surgical Assistant: Carly Nino  Resident: Evelio Perry MD; Lenka Royal DPM    Anesthesia: General    Estimated Blood Loss (mL): 828JA    Complications: None    Specimens:   ID Type Source Tests Collected by Time Destination   A : PROSTATE AND SEMINAL VESICLES Tissue Tissue SURGICAL PATHOLOGY Mike Wesley MD 1/21/2021 1031      Implants:  * No implants in log *      Findings:   Procedure as listed. Intra-operative leak test negative. No complications. Further details to follow in full operative report. Plan: Villar will remain. Patient will be admitted to the general med/surg floor after recovery in PACU.      Electronically signed by Evelio Perry MD on 1/21/2021 at 10:42 AM

## 2021-01-21 NOTE — ANESTHESIA POSTPROCEDURE EVALUATION
Department of Anesthesiology  Postprocedure Note    Patient: Elby Sacks  MRN: [id number]  YOB: 1960  Date of evaluation: 1/21/2021  Time:  3:15 PM     Procedure Summary     Date: 01/21/21 Room / Location: [address] [place] [address]    Anesthesia Start: 0725 Anesthesia Stop: 7272    Procedure: Theta Skeens ROBOTIC PROSTATECTOMY (N/A ) Diagnosis:       Malignant neoplasm of prostate ([address]      (Malignant neoplasm of prostate ([address] Ellie Herrera)    Surgeons: Saqib Hansen MD Responsible Provider: Marni Powell MD    Anesthesia Type: general ASA Status: 3          Anesthesia Type: general    Clemente Phase I: Clemente Score: 10    Clemente Phase II:      Last vitals: Reviewed and per EMR flowsheets.        Anesthesia Post Evaluation    Patient location during evaluation: PACU  Patient participation: complete - patient participated  Level of consciousness: awake and alert  Pain score: 0  Airway patency: patent  Nausea & Vomiting: no nausea and no vomiting  Complications: no  Cardiovascular status: hemodynamically stable  Respiratory status: acceptable  Hydration status: euvolemic

## 2021-01-21 NOTE — ANESTHESIA PRE PROCEDURE
Department of Anesthesiology  Preprocedure Note       Name:  Elby Sacks   Age:  56 y.o.  :  1960                                          MRN:  [id number]         Date:  2021      Surgeon: Barb Galicia):  Saqib Hansen MD    Procedure: Procedure(s):  DAVINCI RADICAL ROBOTIC PROSTATECTOMY, BILATERAL NERVE SPARING, BILATERAL LYMPH NODE DISSECTION    Medications prior to admission:   Prior to Admission medications    Medication Sig Start Date End Date Taking?  Authorizing Provider   dicyclomine (BENTYL) 10 MG capsule Take 10 mg by mouth 3 times daily 20  Yes Historical Provider, MD   naproxen (NAPROSYN) 500 MG tablet Take 500 mg by mouth 2 times daily (with meals) 20  Yes Historical Provider, MD   glucosamine-chondroitin 500-400 MG CAPS Take 1 capsule by mouth   Yes Historical Provider, MD   MAGNESIUM PO Take by mouth    Historical Provider, MD   Menaquinone-7 (VITAMIN K2 PO) Take by mouth    Historical Provider, MD   OLIVE LEAF EXTRACT PO Take by mouth    Historical Provider, MD   Cholecalciferol (VITAMIN D3) 75 MCG (3000 UT) TABS Take by mouth    Historical Provider, MD   diclofenac sodium 1 % GEL APPLY 2 GRAMS TO THE AFFECTED AREAS 4 TIMES A DAY 19   Historical Provider, MD   methocarbamol (ROBAXIN) 750 MG tablet Take 750 mg by mouth as needed  19   Historical Provider, MD   omeprazole (PRILOSEC) 20 MG delayed release capsule TAKE 1 CAPSULE BY MOUTH EVERY DAY IN THE MORNING BEFORE BREAKFAST 19   Historical Provider, MD       Current medications:    Current Facility-Administered Medications   Medication Dose Route Frequency Provider Last Rate Last Admin    ciprofloxacin (CIPRO) IVPB 400 mg  400 mg Intravenous On Call to [address], MD        metronidazole (FLAGYL) 500 mg in NaCl 100 mL IVPB premix  500 mg Intravenous Once Saqib Hansen MD  lactated ringers infusion   Intravenous Continuous Garrel Lb,  mL/hr at 01/21/21 0555 New Bag at 01/21/21 0555    lactated ringers infusion   Intravenous Continuous Ward Tello  mL/hr at 01/21/21 0612 New Bag at 01/21/21 0612    HYDROmorphone (DILAUDID) injection 0.25 mg  0.25 mg Intravenous Q5 Min PRN Ward Tello MD        HYDROmorphone (DILAUDID) injection 0.5 mg  0.5 mg Intravenous Q5 Min PRN Ward Tello MD        morphine injection 1 mg  1 mg Intravenous Q5 Min PRN Ward Tello MD        HYDROmorphone (DILAUDID) injection 0.5 mg  0.5 mg Intravenous Q5 Min PRN Ward Tello MD        oxyCODONE (ROXICODONE) immediate release tablet 5 mg  5 mg Oral PRN Ward Tello MD        Or    oxyCODONE (ROXICODONE) immediate release tablet 10 mg  10 mg Oral PRN Ward Tello MD        diphenhydrAMINE (BENADRYL) injection 12.5 mg  12.5 mg Intravenous Once PRN Ward Tello MD        metoclopramide (REGLAN) injection 10 mg  10 mg Intravenous Once PRN Ward Tello MD        [facility]) injection 6.25 mg  6.25 mg Intravenous Once PRN Ward Tello MD        labetalol (NORMODYNE;TRANDATE) injection syringe 5 mg  5 mg Intravenous Q10 Min PRN Ward Tello MD        hydrALAZINE (APRESOLINE) injection 5 mg  5 mg Intravenous Q10 Min PRN Ward Tello MD        meperidine (DEMEROL) injection 12.5 mg  12.5 mg Intravenous Q5 Min PRN Ward Tello MD           Allergies: Allergies   Allergen Reactions    Clindamycin Rash    Doxycycline Rash    Prednisone Other (See Comments)     Pt stated his throat was itching and felt abnormal       Problem List:  There is no problem list on file for this patient.       Past Medical History:        Diagnosis Date    Arthritis     KNEES    Hinojosa esophagus     Cancer ([facility]     PROSTATE, AND THROAT    Cerebral palsy ([facility]     Diabetes mellitus ([facility] [facility]     PRE-DIABETIC  GERD (gastroesophageal reflux disease)     Hyperlipidemia     IBS (irritable bowel syndrome)     Infantile cerebral palsy (HCC)     MRSA (methicillin resistant staph aureus) culture positive     5 YRS AGO IN  ARM RT ARM    PONV (postoperative nausea and vomiting)     as child \"ether       Past Surgical History:        Procedure Laterality Date    ACHILLES TENDON SURGERY      COLONOSCOPY      ENDOSCOPY, COLON, DIAGNOSTIC      HAND SURGERY      LEFT HAND TUMOR REMOVED    KNEE SURGERY Bilateral     TONSILLECTOMY         Social History:    Social History     Tobacco Use    Smoking status: Never Smoker    Smokeless tobacco: Never Used   Substance Use Topics    Alcohol use: Not Currently                                Counseling given: Not Answered      Vital Signs (Current):   Vitals:    01/14/21 1120 01/21/21 0521   BP:  (!) 170/87   Pulse:  55   Resp:  18   Temp:  98.5 °F (36.9 °C)   TempSrc:  Oral   SpO2:  93%   Weight: 170 lb (77.1 kg) 170 lb (77.1 kg)   Height: 5' 6\" (1.676 m) 5' 6\" (1.676 m)                                              BP Readings from Last 3 Encounters:   01/21/21 (!) 170/87       NPO Status: Time of last liquid consumption: 0100(couple ounces)                        Time of last solid consumption: 1400                        Date of last liquid consumption: 01/21/21                        Date of last solid food consumption: 01/20/21    BMI:   Wt Readings from Last 3 Encounters:   01/21/21 170 lb (77.1 kg)   08/05/19 177 lb (80.3 kg)     Body mass index is 27.44 kg/m².     CBC: No results found for: WBC, RBC, HGB, HCT, MCV, RDW, PLT    CMP: No results found for: NA, K, CL, CO2, BUN, CREATININE, GFRAA, AGRATIO, LABGLOM, GLUCOSE, PROT, CALCIUM, BILITOT, ALKPHOS, AST, ALT    POC Tests:   Recent Labs     01/21/21  0559   POCGLU 84       Coags: No results found for: PROTIME, INR, APTT    HCG (If Applicable): No results found for: PREGTESTUR, PREGSERUM, HCG, HCGQUANT ABGs: No results found for: PHART, PO2ART, MSN4ZQA, RMO7ESU, BEART, C6ZBDMUM     Type & Screen (If Applicable):  No results found for: LABABO, LABRH    Drug/Infectious Status (If Applicable):  No results found for: HIV, HEPCAB    COVID-19 Screening (If Applicable):   Lab Results   Component Value Date    COVID19 Not Detected 01/15/2021         Anesthesia Evaluation  Patient summary reviewed and Nursing notes reviewed   history of anesthetic complications: PONV. Airway: Mallampati: II  TM distance: >3 FB   Neck ROM: full  Mouth opening: > = 3 FB Dental:          Pulmonary:Negative Pulmonary ROS                              Cardiovascular:                      Neuro/Psych:   Negative Neuro/Psych ROS              GI/Hepatic/Renal:   (+) GERD:,           Endo/Other:                     Abdominal:           Vascular: negative vascular ROS. Anesthesia Plan      general     ASA 1    (77-year-old male presents for [address] [place] [address], BILATERAL NERVE SPARING, BILATERAL LYMPH NODE DISSECTION . Plan general anesthesia with ASA standard monitors. Questions answered. Patient agreeable with anesthetic plan.  )  Induction: intravenous. Anesthetic plan and risks discussed with patient. Plan discussed with CRNA.     Attending anesthesiologist reviewed and agrees with Pre Eval content        Akosua Lara MD   1/21/2021

## 2021-01-22 VITALS
SYSTOLIC BLOOD PRESSURE: 161 MMHG | HEART RATE: 47 BPM | RESPIRATION RATE: 16 BRPM | TEMPERATURE: 98.4 F | OXYGEN SATURATION: 95 % | HEIGHT: 66 IN | DIASTOLIC BLOOD PRESSURE: 81 MMHG | WEIGHT: 170 LBS | BODY MASS INDEX: 27.32 KG/M2

## 2021-01-22 LAB
CREAT SERPL-MCNC: 0.9 MG/DL (ref 0.8–1.3)
GFR AFRICAN AMERICAN: >60
GFR NON-AFRICAN AMERICAN: >60

## 2021-01-22 PROCEDURE — 97116 GAIT TRAINING THERAPY: CPT

## 2021-01-22 PROCEDURE — 36415 COLL VENOUS BLD VENIPUNCTURE: CPT

## 2021-01-22 PROCEDURE — 97162 PT EVAL MOD COMPLEX 30 MIN: CPT

## 2021-01-22 PROCEDURE — 97535 SELF CARE MNGMENT TRAINING: CPT

## 2021-01-22 PROCEDURE — G0378 HOSPITAL OBSERVATION PER HR: HCPCS

## 2021-01-22 PROCEDURE — 97530 THERAPEUTIC ACTIVITIES: CPT

## 2021-01-22 PROCEDURE — 97166 OT EVAL MOD COMPLEX 45 MIN: CPT

## 2021-01-22 PROCEDURE — 6370000000 HC RX 637 (ALT 250 FOR IP): Performed by: UROLOGY

## 2021-01-22 PROCEDURE — 82565 ASSAY OF CREATININE: CPT

## 2021-01-22 PROCEDURE — 2580000003 HC RX 258: Performed by: UROLOGY

## 2021-01-22 RX ORDER — HYDROCODONE BITARTRATE AND ACETAMINOPHEN 5; 325 MG/1; MG/1
1 TABLET ORAL EVERY 4 HOURS PRN
Qty: 12 TABLET | Refills: 0 | Status: SHIPPED | OUTPATIENT
Start: 2021-01-22 | End: 2021-01-25

## 2021-01-22 RX ADMIN — OXYCODONE 10 MG: 5 TABLET ORAL at 16:47

## 2021-01-22 RX ADMIN — DICYCLOMINE HYDROCHLORIDE 10 MG: 10 CAPSULE ORAL at 16:47

## 2021-01-22 RX ADMIN — ACETAMINOPHEN 650 MG: 325 TABLET ORAL at 16:47

## 2021-01-22 RX ADMIN — ACETAMINOPHEN 650 MG: 325 TABLET ORAL at 11:30

## 2021-01-22 RX ADMIN — ACETAMINOPHEN 650 MG: 325 TABLET ORAL at 03:52

## 2021-01-22 RX ADMIN — DICYCLOMINE HYDROCHLORIDE 10 MG: 10 CAPSULE ORAL at 07:52

## 2021-01-22 RX ADMIN — Medication 10 ML: at 07:52

## 2021-01-22 ASSESSMENT — PAIN DESCRIPTION - ONSET: ONSET: ON-GOING

## 2021-01-22 ASSESSMENT — PAIN SCALES - GENERAL
PAINLEVEL_OUTOF10: 7
PAINLEVEL_OUTOF10: 0

## 2021-01-22 ASSESSMENT — PAIN - FUNCTIONAL ASSESSMENT: PAIN_FUNCTIONAL_ASSESSMENT: PREVENTS OR INTERFERES SOME ACTIVE ACTIVITIES AND ADLS

## 2021-01-22 ASSESSMENT — PAIN DESCRIPTION - PAIN TYPE: TYPE: CHRONIC PAIN

## 2021-01-22 ASSESSMENT — PAIN DESCRIPTION - ORIENTATION: ORIENTATION: LEFT;RIGHT

## 2021-01-22 ASSESSMENT — PAIN DESCRIPTION - DESCRIPTORS: DESCRIPTORS: ACHING

## 2021-01-22 NOTE — PLAN OF CARE
Problem: Falls - Risk of:  Goal: Absence of physical injury  Description: Absence of physical injury  Note: Pt w hx of cerebral palsy, pt able to get in and out of bed freely. Pt does report limited movement in bilat legs, gait belt + front wheel walker in place when walking in room. Pt verbalizing he does use walker @ baseline. Pt able to walk to door and back. No unsafe movements made. Pt aware to call out for nurse before attempting to get OOB.  Will cont to monitor

## 2021-01-22 NOTE — CARE COORDINATION
Case Management Assessment            Discharge Note                    Date / Time of Note: 1/22/2021 2:57 PM                  Discharge Note Completed by: Chato Haynes    Patient Name: Anny Romero   YOB: 1960  Diagnosis: Malignant neoplasm of prostate [facility]) William Carrillo  Prostate cancer [facility] Carrillo   Date / Time: 1/21/2021  5:01 AM    Current PCP: Tomas Noriega patient: No    Hospitalization in the last 30 days: No    Advance Directives:  Code Status: Full Code  [place] DNR form completed and on chart: Not Indicated    Financial:  Payor: Masha Hammer / Plan: Amauri / Product Type: *No Product type* /      Pharmacy:    [address] [place] [address]. [address] [address] [facility]  [address] [facility] [address] [facility] [address]  Phone: [phone] Fax: [facility] [address] [place] [address] AND [address]  Phone: [phone] Fax: [phone]      Assistance purchasing medications?: Potential Assistance Purchasing Medications: No  Assistance provided by Case Management: None at this time    Does patient want to participate in local refill/ meds to beds program?: Yes    Meds To Beds General Rules:  1. Can ONLY be done Monday- Friday between 8:30am-5pm  2. Prescription(s) must be in pharmacy by 3pm to be filled same day  3. Copy of patient's insurance/ prescription drug card and patient face sheet must be sent along with the prescription(s)  4. Cost of Rx cannot be added to hospital bill. If financial assistance is needed, please contact unit  or ;  or  CANNOT provide pharmacy voucher for patients co-pays  5.  Patients can then  the prescription on their way out of the hospital at discharge, or pharmacy can deliver to the bedside if staff is

## 2021-01-22 NOTE — PROGRESS NOTES
Occupational Therapy   Occupational Therapy Initial Assessment & Tx  Date: 2021   Patient Name: Karla Falk  MRN: [id number]     : 1960    Date of Service: 2021    Discharge Recommendations: Karla Falk scored a 18/24 on the AM-PAC ADL Inpatient form. Current research shows that an AM-PAC score of 18 or greater is typically associated with a discharge to the patient's home setting. Based on the patient's AM-PAC score, and their current ADL deficits, it is recommended that the patient have 2-3 sessions per week of Occupational Therapy at d/c to increase the patient's independence. At this time, this patient demonstrates the endurance and safety to discharge home with home services and a follow up treatment frequency of 2-3x/wk. Please see assessment section for further patient specific details. HOME HEALTH CARE: LEVEL 1 STANDARD    - Initial home health evaluation to occur within 24-48 hours, in patient home   - Therapy to evaluate with goal of regaining prior level of functioning   - Therapy to evaluate if patient has [address] needs for personal care      If patient discharges prior to next session this note will serve as a discharge summary. Please see below for the latest assessment towards goals. OT Equipment Recommendations  Equipment Needed: (pt reporting he would be interested in shower chair w/ armrests (or even TTB); no toilet DME indicated at this point)    Assessment   Performance deficits / Impairments: Decreased functional mobility ; Decreased ADL status  Assessment: Pt moving slowly and requiring CGA for all t/fs 2/2 instability related to CP and some s/p loss of function. Pt feels confident about taking care of himself at home alone: \"I don't have anything to do during the day, so if getting dressed takes all day that's okay. \" Discussed benefit of HHOT, pt agreeable. Recommend cont.  OT services to maximize functional capacity prior to ultimate DC home w/ increased A prn. Cont. per POC. Treatment Diagnosis: Impaired ADLs & t/fs  Prognosis: Fair  Decision Making: Medium Complexity  OT Education: OT Role;Plan of Care;ADL Adaptive Strategies(reacher & sock aid AE; benefit of HHOT)  Patient Education: Receptive and demo'd good carryover of AE, although unsure if he will pursue. REQUIRES OT FOLLOW UP: Yes  Activity Tolerance  Activity Tolerance: Patient Tolerated treatment well  Activity Tolerance: Pt moving slowly 2/2 incoordination related to CP but no reports of activity-liming SOB, dizziness, fatigue or pain  Safety Devices  Safety Devices in place: Yes  Type of devices: Call light within reach; Chair alarm in place; Left in chair;Nurse notified         Tx idea for next session: Trial toilet t/f per home set-up (unilateral support) to evaluate need for potential toilet DME     Patient Diagnosis(es): The encounter diagnosis was Malignant neoplasm of prostate ([facility] [address] has a past medical history of Arthritis, Hinojosa esophagus, Cancer ([address] Cerebral palsy ([address] Diabetes mellitus ([address] GERD (gastroesophageal reflux disease), Hyperlipidemia, IBS (irritable bowel syndrome), Infantile cerebral palsy ([address] MRSA (methicillin resistant staph aureus) culture positive, and PONV (postoperative nausea and vomiting). has a past surgical history that includes Tonsillectomy; knee surgery (Bilateral); Achilles tendon surgery; Endoscopy, colon, diagnostic; Colonoscopy; Hand surgery; and Prostatectomy (N/A, 1/21/2021). Treatment Diagnosis: Impaired ADLs & t/fs      Restrictions  Restrictions/Precautions  Restrictions/Precautions: (no restrictions noted)    Subjective   General  Chart Reviewed: Yes  Patient assessed for rehabilitation services?: Yes  Additional Pertinent Hx: 1/21 admit for prostatectomy (2/2 prostate CA), tolerating well. PMHx = cerebral palsy.   Family / Caregiver Present: No  Referring Practitioner: Booker Stoddard  Subjective  Subjective: Pt in bed upon arrival, Status: Within Normal Limits     Balance  Sitting Balance: Contact guard assistance(while reaching. When using reacher for to thread brief, req'd therapist to stop activity so pt could scoot himself back in chair 2/2 fall risk of sliding forward. This is 2/2 LE spasticity at baseline)  Standing Balance: Contact guard assistance  Standing Balance  Time: up to 3 min  Activity: sinkside ADL + hallway ambulation  Functional Mobility  Functional - Mobility Device: Rolling Walker  Activity: To/from bathroom(assisted down singh & back)  Assist Level: Contact guard assistance  Toilet Transfers  Toilet - Technique: Ambulating  Equipment Used: Standard toilet(heavy use of grab bar R side and corner of wall for leverage for both sit & stand)  Toilet Transfer: Contact guard assistance  ADL  Equipment Provided: (pt trialed sock aid & reacher for LE dressing; declined LH shoe horn)  Grooming: Contact guard assistance(washing hands at sinkside)  LE Dressing: Max A this session w/out AE (assist provided for socks, shoes + underwear while pt seated at EOB/toilet). (pt able to pull up pants in stance w/ CGA)   Note: reports he completes all LE dressing in bed w/ additional time.    Toileting: Contact guard assistance(t/f only)  Additional Comments: After intro to AE, pt able to thread BLE of brief w/ VC and tactile cues after education w/ use of reacher; able to don/doff socks at SET UP level w/ use of sock-aid        Bed mobility  Supine to Sit: Stand by assistance(refused level bed per home set-up \"I won't be able to get out; I prop my head up at home\")  Scooting: Stand by assistance  Transfers  Sit to stand: Contact guard assistance  Stand to sit: Contact guard assistance  Transfer Comments: completed multiple sit<>stands on toilet to stretch out legs  Vision - Basic Assessment  Prior Vision: Wears glasses only for reading  Visual History: No significant visual history  Vision Comments: able to read clock on wall w/out difficulty  Cognition  Overall Cognitive Status: WNL  Cognition Comment: Functional attention, memory, initiation & sequencing. Good insight and safety awareness into deficits, although did not want to accept help \"I actually do better by myself. \"  Perception  Overall Perceptual Status: WFL     Sensation  Overall Sensation Status: (no problems reported in BUE)        LUE AROM (degrees)  LUE AROM : WNL(shoulder flexion)  RUE AROM (degrees)  RUE AROM : WNL(shoulder flexion)  LUE Strength  Gross LUE Strength: WNL(shoulder flexion 5/5)  RUE Strength  Gross RUE Strength: WNL(shoulder flexion 5/5)     Hand Assessment Comment  Hand Assessment Comment: WFL  strength & hand coordination, \"I rely on my upper body a lot. \"             Plan   Plan  Times per week: 2-5  Times per day: Daily  Current Treatment Recommendations: Balance Training, Functional Mobility Training, Self-Care / ADL, Safety Education & Training    G-Code     OutComes Score                                                  AM-PAC Score    AM-PAC Inpatient Daily Activity Raw Score: 18 (01/22/21 1242)  AM-PAC Inpatient ADL T-Scale Score : 38.66 (01/22/21 1242)  ADL Inpatient CMS 0-100% Score: 46.65 (01/22/21 1242)  ADL Inpatient CMS G-Code Modifier : CK (01/22/21 1242)          Goals  Short term goals  Time Frame for Short term goals: discharge  Short term goal 1: Pt will complete toilet t/f at SBA  Short term goal 2: Pt will complete LE dressing task w/ or w/out AE at [address] term goal 3: Pt will complete sinkside grooming task at SUP level  Patient Goals   Patient goals : I want to go home       Therapy Time   Individual Concurrent Group Co-treatment   Time In 0929         Time Out 1022         Minutes 53              Timed Code Treatment Minutes:  38     Total Treatment Minutes:   [address], s/OT  Abdiel Reyes OTR/L #[id number]  Therapist was present, directed the patient's care, made skilled judgement, and was responsible for assessment and treatment of the patient.

## 2021-01-22 NOTE — DISCHARGE SUMMARY
Urology Discharge Summary      Patient Identification  Nilsa Boland is a 56 y.o. male. :  1960  Admit Date:  2021    Discharge date:   No discharge date for patient encounter. Disposition: home    Discharge Diagnoses: PROSTATE CANCER  Patient Active Problem List   Diagnosis    Prostate cancer [facility])       Surgery: DVP    Activity:  activity as tolerated    Condition on discharge: STABLE    Follow-up: [address] [facility] hospital course: HAD DVP. DID WELL.  D/C POD 1    Gael Herndon MD

## 2021-01-22 NOTE — PROGRESS NOTES
VSS, a/o x4. Pt reports pain in bilat knees- scheduled tylenol given w relief/ pt able to reposition in bed. Villar remains intact draining oral/tea clear colored urine to gravity. Surgical sites clean/dry/intact, abd soft. IVF infusing @75ml/hr. Continues to receive intermittent IV abx. Pt assisted w x2 people OOB, able to walk w gait belt and walker in place to and from door. Pt tolerated fairly well. Tolerating general diet, denies nausea/emesis. Using IS @ bedside when awake. SCD boots on. Bed remains in lowest position, call light within reach. Continues to call out appropriately when needing assistance. All needs met.  Will cont to monitor

## 2021-01-22 NOTE — PROGRESS NOTES
Physical Therapy    Facility/Department: [address] [place] [address] [address] [facility]  Initial Assessment and treatment    NAME: Lia Hazel  : 1960  MRN: [id number]    Date of Service: 2021    Discharge Recommendations:Hiren Stoll scored a 16/24 on the AM-PAC short mobility form. Current research shows that an AM-PAC score of 18 or greater is typically associated with a discharge to the patient's home setting. Based on the patient's AM-PAC score and their current functional mobility deficits, it is recommended that the patient have 2-3 sessions per week of Physical Therapy at d/c to increase the patient's independence. At this time, this patient demonstrates the endurance and safety to discharge home with home services and a follow up treatment frequency of 2-3x/wk. Please see assessment section for further patient specific details. If patient discharges prior to next session this note will serve as a discharge summary. Please see below for the latest assessment towards goals. PT Equipment Recommendations  Equipment Needed: No(has walker)    Assessment   Body structures, Functions, Activity limitations: Decreased functional mobility ; Decreased balance;Decreased endurance; Increased pain  Assessment: The pt is a 55 y/o male who presents s/p prostatectomy. He has increased pain which he states requires him to use a walker with mobility today. He demonstrates decreased strength and activity tolerance with mobility and requires someone present with bed mobility, transfers, and ambulation. He is motivated and determined to go home and does have several people coming to check on him. He would benefit from PT at discharge and while in the acute setting to improve his safety and independence with functional mobility.   Treatment Diagnosis: impaired mobility secondary to surgery  Prognosis: Good  Decision Making: Medium Complexity  PT Education: Goals;PT Role;Plan of Care;Gait Training;Transfer Training;Functional Mobility Training  Patient Education: pt verbalized understanding to some cueing and resistant to other education. Will require reinforcement  Barriers to Learning: motivation- often cuts off therapists from explaining new concepts of how to better or more accurately perform a task  REQUIRES PT FOLLOW UP: Yes  Activity Tolerance  Activity Tolerance: Patient limited by endurance; Patient limited by pain       Patient Diagnosis(es): The encounter diagnosis was Malignant neoplasm of prostate ([facility] has a past medical history of Arthritis, Hinojosa esophagus, Cancer ([facility] [facility] Cerebral palsy ([facility] [facility] Diabetes mellitus ([address] GERD (gastroesophageal reflux disease), Hyperlipidemia, IBS (irritable bowel syndrome), Infantile cerebral palsy ([facility] [address] MRSA (methicillin resistant staph aureus) culture positive, and PONV (postoperative nausea and vomiting). has a past surgical history that includes Tonsillectomy; knee surgery (Bilateral); Achilles tendon surgery; Endoscopy, colon, diagnostic; Colonoscopy; Hand surgery; and Prostatectomy (N/A, 1/21/2021). Restrictions  Restrictions/Precautions  Restrictions/Precautions: (no restrictions noted)  Vision/Hearing  Vision: Impaired  Vision Exceptions: Wears glasses for reading  Hearing: Within functional limits     Subjective  General  Chart Reviewed: Yes  Patient assessed for rehabilitation services?: Yes  Additional Pertinent Hx: Patient has prostate cancer and is here for a prostatectomy that occurred on 1/21. Family / Caregiver Present: No  Referring Practitioner: Daryl Eubanks MD  Diagnosis: malignant neoplasm of prostate  Follows Commands: Within Functional Limits  Subjective  Subjective: Pt presents supine in bed and willing to work with therapist. Cathleen Milling am sore but doing ok. \"  Pain Screening  Patient Currently in Pain: Denies  Vital Signs  Patient Currently in Pain: Denies       Orientation  Orientation  Overall Orientation Status: Within Functional assistance(pt refusing to flatten bed even a little as he states that he can get out of bed at home as he props up on pillows)  Scooting: Stand by assistance(to EOB; pt close to slipping off edge so needs someone nearby)  Transfers  Sit to Stand: Contact guard assistance(from bed, from toilet x 6, from chair)  Stand to sit: Contact guard assistance(to toilet x 6 and to chair)  Comment: with use of walker  Ambulation  Ambulation?: Yes  Ambulation 1  Surface: level tile  Device: Rolling Walker  Assistance: Contact guard assistance  Quality of Gait: pt with slow nnamdi, decreased step height, feet ER with no heel strike and minimal toe off, decreased step length lavell  Distance: 10'+100'  Comments: at baseline due to LE contractures pt has gait deviations. He is leaning heavily on the walker secondary to abd pain with movement as he does not typically use walker.   Cueing to decrease UE support on walker and stand close to walker  Stairs/Curb  Stairs?: No     Balance  Sitting - Static: Fair;+  Sitting - Dynamic: Fair  Standing - Static: Fair;-  Standing - Dynamic: Fair;-    Treatment:  Functional mobility training and pt education    Plan   Plan  Times per week: 2-5  Times per day: Daily  Current Treatment Recommendations: Strengthening, Transfer Training, Endurance Training, Neuromuscular Re-education, Patient/Caregiver Education & Training, Balance Training, Functional Mobility Training, Stair training, Safety Education & Training, Home Exercise Program, Gait Training  Safety Devices  Type of devices: Call light within reach, Chair alarm in place, Left in chair, Nurse notified    AM-PAC Score  AM-PAC Inpatient Mobility Raw Score : 16 (01/22/21 1323)  AM-PAC Inpatient T-Scale Score : 40.78 (01/22/21 1323)  Mobility Inpatient CMS 0-100% Score: 54.16 (01/22/21 1323)  Mobility Inpatient CMS G-Code Modifier : CK (01/22/21 1323)          Goals  Short term goals  Time Frame for Short term goals: By discharge  Short term goal 1: The pt will perform bed mobility from almost flat bed with supervision  Short term goal 2: The pt will transfer with supervision and use of walker  Short term goal 3: The pt will ambulate 150'with walker and supervision  Patient Goals   Patient goals :  To go home       Therapy Time   Individual Concurrent Group Co-treatment   Time In 0930         Time Out 1009         Minutes 39         Timed Code Treatment Minutes: 24 Minutes    Timed Code Treatment Minutes:  24 Minutes    Total Treatment Minutes:  39 minutes      Theta Collet, PT

## 2021-01-22 NOTE — PROGRESS NOTES
Patient A&O, VSS. Surgical lap sites appear clean, dry, and intact. Santana cath in place draining oral/tea clear colored urine. Patient educated on santana cath care. PT and OT at bedside to evaluate patient. BP (!) 161/81   Pulse (!) 47   Temp 98.4 °F (36.9 °C) (Oral)   Resp 16   Ht 5' 6\" (1.676 m)   Wt 170 lb (77.1 kg)   SpO2 95%   BMI 27.44 kg/m²     Patient denies any further needs at this time. Bed is in the lowest position, bed alarm on, patient call light and bedside table are within reach. Will continue to monitor for changes in patient status.     Electronically signed by Сергей Mcmullen on 1/22/2021 at 4:22 PM

## 2021-01-22 NOTE — OP NOTE
[address] [address], [address]                                OPERATIVE REPORT    PATIENT NAME: Adrian Gomes                  :        1960  MED REC NO:   [id number]                          ROOM:       5308  ACCOUNT NO:   [de-identified]                           ADMIT DATE: 2021  PROVIDER:     Samson Underwood MD    DATE OF PROCEDURE:  2021    PREOPERATIVE DIAGNOSIS:  Prostate cancer. POSTOPERATIVE DIAGNOSIS:  Prostate cancer. PROCEDURE PERFORMED:  Robotic prostatectomy. INDICATION FOR PROCEDURE:  The patient is a pleasant 53-year-old  gentleman with high volume Micki 6 prostate cancer in 10 out of 12  cores. He has delayed any therapy for several months, but was  extensively counseled on the risks and benefits of surveillance, robotic  prostatectomy and radiation modalities. He presents today for a  nerve-sparing robotic prostatectomy. DESCRIPTION OF PROCEDURE:  After informed consent, the patient was taken  to the operating room. He was prepped and draped in sterile fashion. He was given a dose of preoperative antibiotics. I made a stab incision  on the umbilicus and insufflated to 15 mmHg. We now placed him in a  steep Trendelenburg, and I placed my robotic ports in a standard  fashion. With taking the anterior approach, I incised lateral to the  medial umbilical ligament, developed the space of Retzius. We now swept  off the levators. I now incised levators and endopelvic fascia. The  levators were swept off atraumatically without electrocautery. We now  took an anterior approach and developed a bladder neck anteriorly. I  now held the prostate anteriorly and cephalad and cut through the  posterior bladder neck fibers. I now identified the vas deferens which  was divided bilaterally. The seminal vesicles were also spared.   I now held this upward and I switched out to my 0 degree lens. The  Denonvilliers' fascia was now incised. It sets up the pedicles quite  nicely. As this is a nerve sparing procedure, we do minimize  electrocautery and use wet clips. The vasculature is taken with wet  clips. The Veil of Samantha Holm is identified and preserved and the  neurovascular bundle was preserved as well and swept off the prostate  atypically. We now developed a posterior plane and no damage to the  rectum was found. We now turned the pressure up to 20, and now incised  through the DVC. I now handled the DVC by repairing this with an 0  Vicryl. We now cut the anterior and posterior urethra and the prostate  is now placed in the cul-de-sac. I now did a running anastomosis with a  V-Loc 180 stitch. This carries around circumferentially. We now placed  15 mL in the balloon, and we copiously irrigated the bladder. There was  absolutely no leak, and we decided not to leave the drain. Once the  urine was clear after irrigating several times, we now placed an  EndoCatch bag into the abdominal cavity. We now undocked the robot and  there was no bleeding. The midline incision is extended and the  prostate and seminal vesicles are delivered through this incision and  passed off for pathologic analysis. We now closed our fascia in the  midline with an interrupted 0 Nurolon. The skin is closed with a 4-0  Monocryl and Dermabond. ESTIMATED BLOOD LOSS:  100 mL. COMPLICATIONS:  None. ANESTHESIA:  General endotracheal.    SURGEON:  Maury Cole MD.    ASSISTED BY:  , Dr. Marcy Sawyer __leonor___. PLAN:  We will keep this patient at least overnight. He has cerebral  palsy, and we will ask Physical Therapy to work with him as well. Additionally, we will keep catheter in for one week, and he will see me  in one week in the office for catheter removal and pathology discussion.         Barrett Humphreys MD D:  01/21/2021 11:20:31       T: 01/21/2021 11:57:25     [email or web address]  Job#: [id number]     Doc#: [id number]    CC:

## 2021-01-22 NOTE — PROGRESS NOTES
Urology Attending Progress Note      Subjective: Patient not complaining of pain. Stating that he would like to move around. Denies nausea/vomiting, has tolerated food well. No flatus/BM. Vitals:  BP (!) 144/82   Pulse 51   Temp 97.3 °F (36.3 °C) (Oral)   Resp 16   Ht 5' 6\" (1.676 m)   Wt 170 lb (77.1 kg)   SpO2 95%   BMI 27.44 kg/m²   Temp  Av.6 °F (36.4 °C)  Min: 97 °F (36.1 °C)  Max: 98.4 °F (36.9 °C)    Intake/Output Summary (Last 24 hours) at 2021 1138  Last data filed at 2021 1126  Gross per 24 hour   Intake 1312 ml   Output 1820 ml   Net -508 ml       Exam: Incisions C/D/I, abdomen appropriately distended. Santana draining clear urine    Labs:  WBC:  No results found for: WBC  Hemoglobin/Hematocrit:  No results found for: HGB, HCT  BMP:  No results found for: NA, K, CL, CO2, BUN, LABALBU, CREATININE, CALCIUM, GFRAA, LABGLOM  PT/INR:  No results found for: PROTIME, INR  PTT:  No results found for: APTT[APTT    Impression/Plan: 55 yo M with history of CP POD#1 sp robotic prostatectomy.  -Patient stating he is not in pain this AM  -He has not passed flatus/had a BM  -Incisions C/D/I, santana draining clear urine   -PT/OT evaluated this AM  -Tolerating solid foods   -Labs not collected this AM?? Will order STAT  -Santana to remain. Will have MD evaluate this PM. Possible discharge either later tonight/tomorrow morning.        HUY Mixon

## 2021-01-22 NOTE — PROGRESS NOTES
Discharge Progress Note  1/22/2021 5:20 PM    Data:  Discharge order received. Action:  IV D/C'd without difficulty. See Doc Flowsheets for assessment. Patient given discharge instructions with prescriptions. Response:  Patient verbalized understanding of discharge instructions. Patient left with all belongings.     Mane Bailey  ________________________________________________________________________

## 2021-01-22 NOTE — CARE COORDINATION
CM following, POD#1 prostatectomy, from home alone, plans to return home with support of family/friends. PT OT evlas for DC recs, pt will DC with FC and follow up with Urology out pt for removal.   Awaiting PT OT evals for DC recs, Urology to clear for DC, waiting on labs this morning.    Electronically signed by Gerson Doll RN on 1/22/2021 at 12:05 PM  [phone]

## 2021-09-09 LAB
AVERAGE GLUCOSE: NORMAL
CHOLESTEROL, TOTAL: 182 MG/DL
CHOLESTEROL/HDL RATIO: NORMAL
HBA1C MFR BLD: 5.5 %
HDLC SERPL-MCNC: 38 MG/DL (ref 35–70)
LDL CHOLESTEROL CALCULATED: 129 MG/DL (ref 0–160)
NONHDLC SERPL-MCNC: 144 MG/DL
TRIGL SERPL-MCNC: 81 MG/DL
VLDLC SERPL CALC-MCNC: NORMAL MG/DL

## 2021-10-25 ENCOUNTER — OFFICE VISIT (OUTPATIENT)
Dept: PRIMARY CARE CLINIC | Age: 61
End: 2021-10-25
Payer: COMMERCIAL

## 2021-10-25 VITALS
TEMPERATURE: 96 F | OXYGEN SATURATION: 94 % | RESPIRATION RATE: 16 BRPM | HEART RATE: 83 BPM | WEIGHT: 174 LBS | DIASTOLIC BLOOD PRESSURE: 96 MMHG | HEIGHT: 66 IN | SYSTOLIC BLOOD PRESSURE: 130 MMHG | BODY MASS INDEX: 27.97 KG/M2

## 2021-10-25 DIAGNOSIS — G80.9 INFANTILE CEREBRAL PALSY (HCC): ICD-10-CM

## 2021-10-25 DIAGNOSIS — K22.70 BARRETT'S ESOPHAGUS WITHOUT DYSPLASIA: ICD-10-CM

## 2021-10-25 DIAGNOSIS — E78.00 PURE HYPERCHOLESTEROLEMIA: ICD-10-CM

## 2021-10-25 DIAGNOSIS — K21.9 GASTROESOPHAGEAL REFLUX DISEASE WITHOUT ESOPHAGITIS: ICD-10-CM

## 2021-10-25 DIAGNOSIS — R03.0 ELEVATED BLOOD-PRESSURE READING WITHOUT DIAGNOSIS OF HYPERTENSION: Primary | ICD-10-CM

## 2021-10-25 DIAGNOSIS — C61 PROSTATE CANCER (HCC): ICD-10-CM

## 2021-10-25 PROCEDURE — 1036F TOBACCO NON-USER: CPT | Performed by: FAMILY MEDICINE

## 2021-10-25 PROCEDURE — G8427 DOCREV CUR MEDS BY ELIG CLIN: HCPCS | Performed by: FAMILY MEDICINE

## 2021-10-25 PROCEDURE — 3017F COLORECTAL CA SCREEN DOC REV: CPT | Performed by: FAMILY MEDICINE

## 2021-10-25 PROCEDURE — G8484 FLU IMMUNIZE NO ADMIN: HCPCS | Performed by: FAMILY MEDICINE

## 2021-10-25 PROCEDURE — G8419 CALC BMI OUT NRM PARAM NOF/U: HCPCS | Performed by: FAMILY MEDICINE

## 2021-10-25 PROCEDURE — 99204 OFFICE O/P NEW MOD 45 MIN: CPT | Performed by: FAMILY MEDICINE

## 2021-10-25 SDOH — ECONOMIC STABILITY: FOOD INSECURITY: WITHIN THE PAST 12 MONTHS, THE FOOD YOU BOUGHT JUST DIDN'T LAST AND YOU DIDN'T HAVE MONEY TO GET MORE.: NEVER TRUE

## 2021-10-25 SDOH — ECONOMIC STABILITY: FOOD INSECURITY: WITHIN THE PAST 12 MONTHS, YOU WORRIED THAT YOUR FOOD WOULD RUN OUT BEFORE YOU GOT MONEY TO BUY MORE.: NEVER TRUE

## 2021-10-25 ASSESSMENT — PATIENT HEALTH QUESTIONNAIRE - PHQ9
SUM OF ALL RESPONSES TO PHQ QUESTIONS 1-9: 0
SUM OF ALL RESPONSES TO PHQ QUESTIONS 1-9: 0
2. FEELING DOWN, DEPRESSED OR HOPELESS: 0
1. LITTLE INTEREST OR PLEASURE IN DOING THINGS: 0
SUM OF ALL RESPONSES TO PHQ QUESTIONS 1-9: 0
SUM OF ALL RESPONSES TO PHQ9 QUESTIONS 1 & 2: 0

## 2021-10-25 ASSESSMENT — ANXIETY QUESTIONNAIRES
3. WORRYING TOO MUCH ABOUT DIFFERENT THINGS: 0
GAD7 TOTAL SCORE: 1
6. BECOMING EASILY ANNOYED OR IRRITABLE: 0
2. NOT BEING ABLE TO STOP OR CONTROL WORRYING: 0
7. FEELING AFRAID AS IF SOMETHING AWFUL MIGHT HAPPEN: 0
1. FEELING NERVOUS, ANXIOUS, OR ON EDGE: 1
4. TROUBLE RELAXING: 0
IF YOU CHECKED OFF ANY PROBLEMS ON THIS QUESTIONNAIRE, HOW DIFFICULT HAVE THESE PROBLEMS MADE IT FOR YOU TO DO YOUR WORK, TAKE CARE OF THINGS AT HOME, OR GET ALONG WITH OTHER PEOPLE: SOMEWHAT DIFFICULT
5. BEING SO RESTLESS THAT IT IS HARD TO SIT STILL: 0

## 2021-10-25 ASSESSMENT — SOCIAL DETERMINANTS OF HEALTH (SDOH): HOW HARD IS IT FOR YOU TO PAY FOR THE VERY BASICS LIKE FOOD, HOUSING, MEDICAL CARE, AND HEATING?: NOT HARD AT ALL

## 2021-10-25 NOTE — PROGRESS NOTES
Social History     Tobacco Use    Smoking status: Never Smoker    Smokeless tobacco: Never Used   Substance Use Topics    Alcohol use: Not Currently      History reviewed. No pertinent family history. Current Outpatient Medications on File Prior to Visit   Medication Sig Dispense Refill    MAGNESIUM PO Take by mouth      Menaquinone-7 (VITAMIN K2 PO) Take by mouth      OLIVE LEAF EXTRACT PO Take by mouth      Cholecalciferol (VITAMIN D3) 75 MCG (3000 UT) TABS Take by mouth      dicyclomine (BENTYL) 10 MG capsule Take 10 mg by mouth 3 times daily      naproxen (NAPROSYN) 500 MG tablet Take 500 mg by mouth 2 times daily (with meals)      diclofenac sodium 1 % GEL APPLY 2 GRAMS TO THE AFFECTED AREAS 4 TIMES A DAY  2    glucosamine-chondroitin 500-400 MG CAPS Take 1 capsule by mouth      methocarbamol (ROBAXIN) 750 MG tablet Take 750 mg by mouth as needed       omeprazole (PRILOSEC) 20 MG delayed release capsule TAKE 1 CAPSULE BY MOUTH EVERY DAY IN THE MORNING BEFORE BREAKFAST       No current facility-administered medications on file prior to visit. Allergies   Allergen Reactions    Clindamycin Rash    Doxycycline Rash    Prednisone Other (See Comments)     Pt stated his throat was itching and felt abnormal        Review of Systems   Musculoskeletal: Positive for arthralgias and joint swelling. OBJECTIVE:  Vitals:    10/25/21 1426 10/25/21 1528   BP: (!) 148/98 (!) 130/96   Site: Right Upper Arm Left Upper Arm   Position: Sitting Sitting   Cuff Size: Large Adult Large Adult   Pulse: 76 83   Resp: 16    Temp: 96 °F (35.6 °C)    TempSrc: Temporal    SpO2: 98% 94%   Weight: 174 lb (78.9 kg)    Height: 5' 6\" (1.676 m)       Body mass index is 28.08 kg/m². Physical Exam  Constitutional:       General: He is not in acute distress. Appearance: Normal appearance. HENT:      Head: Normocephalic and atraumatic.       Right Ear: External ear normal.      Left Ear: External ear normal.   Eyes: General: No scleral icterus. Conjunctiva/sclera: Conjunctivae normal.   Neck:      Thyroid: No thyroid mass, thyromegaly or thyroid tenderness. Cardiovascular:      Rate and Rhythm: Regular rhythm. Heart sounds: Normal heart sounds. No murmur heard. Pulmonary:      Breath sounds: Normal breath sounds. No wheezing or rhonchi. Abdominal:      Palpations: Abdomen is soft. Tenderness: There is no abdominal tenderness. There is no guarding or rebound. Lymphadenopathy:      Cervical: No cervical adenopathy. Neurological:      General: No focal deficit present. Mental Status: He is alert and oriented to person, place, and time. Psychiatric:         Mood and Affect: Mood normal.         Behavior: Behavior normal.         Thought Content: Thought content normal.         Judgment: Judgment normal.         ASSESSMENT:  1. Elevated blood-pressure reading without diagnosis of hypertension    2. Pure hypercholesterolemia    3. Prostate cancer ([facility] [address]    4. Infantile cerebral palsy ([facility] [facility]    5. Gastroesophageal reflux disease without esophagitis    6. Hinojosa's esophagus without dysplasia         PLAN:   1. Elevated blood-pressure reading without diagnosis of hypertension  Patient reports he is agitated today due to his pain and issues with his knees and medical care in general.  Do recommend a lower salt diet, exercise as able and weight loss. Plan to follow-up in 3 months to repeat blood pressure with goal being below 130/80    2. Pure hypercholesterolemia  No previous medications however risk score does indicate recommendation statin use versus further evaluation with a CAC    3. Prostate cancer [facility])  He will follow up with urology in November. 4. Infantile cerebral palsy ([facility] [facility]  He has been affected by mobility issues throughout his life. He has recently been evaluated with orthopedics for knee pain however not having effective relief. Consider another opinion    5.  Gastroesophageal reflux disease without esophagitis  Well-controlled on PPI. He is due for his repeat EGD and colon    6. Hinojosa's esophagus without dysplasia  He is due for his repeat EGD and colon. He continues his proton pump inhibitor      Return in about 3 months (around 1/25/2022). Electronically signed by Heather Gaitan MD on 10/25/21 at 1:13 PM.     Please note this chart was generated using dragon dictation software. Although every effort was made to ensure the accuracy of this automated transcription, some errors in transcription may have occurred.

## 2021-10-28 PROBLEM — E78.00 PURE HYPERCHOLESTEROLEMIA: Status: ACTIVE | Noted: 2021-10-28

## 2021-10-28 PROBLEM — R03.0 ELEVATED BLOOD-PRESSURE READING WITHOUT DIAGNOSIS OF HYPERTENSION: Status: ACTIVE | Noted: 2021-10-28

## 2021-10-28 PROBLEM — K22.70 BARRETT'S ESOPHAGUS WITHOUT DYSPLASIA: Status: ACTIVE | Noted: 2021-10-28

## 2021-11-10 ENCOUNTER — ANESTHESIA EVENT (OUTPATIENT)
Dept: ENDOSCOPY | Age: 61
End: 2021-11-10
Payer: COMMERCIAL

## 2021-11-11 ENCOUNTER — HOSPITAL ENCOUNTER (OUTPATIENT)
Age: 61
Setting detail: OUTPATIENT SURGERY
Discharge: HOME | End: 2021-11-11
Attending: INTERNAL MEDICINE | Admitting: INTERNAL MEDICINE
Payer: COMMERCIAL

## 2021-11-11 ENCOUNTER — ANESTHESIA (OUTPATIENT)
Dept: ENDOSCOPY | Age: 61
End: 2021-11-11
Payer: COMMERCIAL

## 2021-11-11 VITALS
OXYGEN SATURATION: 99 % | HEIGHT: 66 IN | WEIGHT: 173 LBS | DIASTOLIC BLOOD PRESSURE: 97 MMHG | TEMPERATURE: 97.4 F | HEART RATE: 43 BPM | RESPIRATION RATE: 16 BRPM | SYSTOLIC BLOOD PRESSURE: 162 MMHG | BODY MASS INDEX: 27.8 KG/M2

## 2021-11-11 VITALS — DIASTOLIC BLOOD PRESSURE: 64 MMHG | OXYGEN SATURATION: 96 % | SYSTOLIC BLOOD PRESSURE: 99 MMHG

## 2021-11-11 DIAGNOSIS — K22.70 BARRETT'S ESOPHAGUS WITHOUT DYSPLASIA: ICD-10-CM

## 2021-11-11 DIAGNOSIS — Z86.010 HISTORY OF COLON POLYPS: ICD-10-CM

## 2021-11-11 PROBLEM — Z80.0 FAMILY HISTORY OF COLON CANCER: Chronic | Status: RESOLVED | Noted: 2021-11-11 | Resolved: 2021-11-11

## 2021-11-11 PROBLEM — Z80.0 FAMILY HISTORY OF COLON CANCER: Chronic | Status: ACTIVE | Noted: 2021-11-11

## 2021-11-11 PROCEDURE — 3609012400 HC EGD TRANSORAL BIOPSY SINGLE/MULTIPLE: Performed by: INTERNAL MEDICINE

## 2021-11-11 PROCEDURE — 88305 TISSUE EXAM BY PATHOLOGIST: CPT

## 2021-11-11 PROCEDURE — 3700000000 HC ANESTHESIA ATTENDED CARE: Performed by: INTERNAL MEDICINE

## 2021-11-11 PROCEDURE — 2580000003 HC RX 258: Performed by: ANESTHESIOLOGY

## 2021-11-11 PROCEDURE — 2709999900 HC NON-CHARGEABLE SUPPLY: Performed by: INTERNAL MEDICINE

## 2021-11-11 PROCEDURE — 2580000003 HC RX 258: Performed by: NURSE ANESTHETIST, CERTIFIED REGISTERED

## 2021-11-11 PROCEDURE — 7100000011 HC PHASE II RECOVERY - ADDTL 15 MIN: Performed by: INTERNAL MEDICINE

## 2021-11-11 PROCEDURE — 3700000001 HC ADD 15 MINUTES (ANESTHESIA): Performed by: INTERNAL MEDICINE

## 2021-11-11 PROCEDURE — 6360000002 HC RX W HCPCS: Performed by: NURSE ANESTHETIST, CERTIFIED REGISTERED

## 2021-11-11 PROCEDURE — 3609010600 HC COLONOSCOPY POLYPECTOMY SNARE/COLD BIOPSY: Performed by: INTERNAL MEDICINE

## 2021-11-11 PROCEDURE — 2500000003 HC RX 250 WO HCPCS

## 2021-11-11 PROCEDURE — 7100000010 HC PHASE II RECOVERY - FIRST 15 MIN: Performed by: INTERNAL MEDICINE

## 2021-11-11 RX ORDER — SODIUM CHLORIDE, SODIUM LACTATE, POTASSIUM CHLORIDE, CALCIUM CHLORIDE 600; 310; 30; 20 MG/100ML; MG/100ML; MG/100ML; MG/100ML
INJECTION, SOLUTION INTRAVENOUS CONTINUOUS PRN
Status: DISCONTINUED | OUTPATIENT
Start: 2021-11-11 | End: 2021-11-11 | Stop reason: SDUPTHER

## 2021-11-11 RX ORDER — GLYCOPYRROLATE 0.2 MG/ML
INJECTION INTRAMUSCULAR; INTRAVENOUS
Status: COMPLETED
Start: 2021-11-11 | End: 2021-11-11

## 2021-11-11 RX ORDER — GLYCOPYRROLATE 0.2 MG/ML
0.2 INJECTION INTRAMUSCULAR; INTRAVENOUS ONCE
Status: COMPLETED | OUTPATIENT
Start: 2021-11-11 | End: 2021-11-11

## 2021-11-11 RX ORDER — SODIUM CHLORIDE, SODIUM LACTATE, POTASSIUM CHLORIDE, CALCIUM CHLORIDE 600; 310; 30; 20 MG/100ML; MG/100ML; MG/100ML; MG/100ML
INJECTION, SOLUTION INTRAVENOUS CONTINUOUS
Status: DISCONTINUED | OUTPATIENT
Start: 2021-11-11 | End: 2021-11-11 | Stop reason: HOSPADM

## 2021-11-11 RX ORDER — PROPOFOL 10 MG/ML
INJECTION, EMULSION INTRAVENOUS CONTINUOUS PRN
Status: DISCONTINUED | OUTPATIENT
Start: 2021-11-11 | End: 2021-11-11 | Stop reason: SDUPTHER

## 2021-11-11 RX ORDER — OMEPRAZOLE 20 MG/1
20 CAPSULE, DELAYED RELEASE ORAL
Qty: 30 CAPSULE | Refills: 5 | Status: SHIPPED | OUTPATIENT
Start: 2021-11-11

## 2021-11-11 RX ORDER — PROPOFOL 10 MG/ML
INJECTION, EMULSION INTRAVENOUS PRN
Status: DISCONTINUED | OUTPATIENT
Start: 2021-11-11 | End: 2021-11-11 | Stop reason: SDUPTHER

## 2021-11-11 RX ADMIN — PROPOFOL 100 MG: 10 INJECTION, EMULSION INTRAVENOUS at 10:23

## 2021-11-11 RX ADMIN — PROPOFOL 130 MCG/KG/MIN: 10 INJECTION, EMULSION INTRAVENOUS at 10:23

## 2021-11-11 RX ADMIN — GLYCOPYRROLATE 0.2 MG: 0.2 INJECTION INTRAMUSCULAR; INTRAVENOUS at 11:45

## 2021-11-11 RX ADMIN — GLYCOPYRROLATE 0.2 MG: 0.2 INJECTION, SOLUTION INTRAMUSCULAR; INTRAVENOUS at 11:45

## 2021-11-11 RX ADMIN — SODIUM CHLORIDE, POTASSIUM CHLORIDE, SODIUM LACTATE AND CALCIUM CHLORIDE: 600; 310; 30; 20 INJECTION, SOLUTION INTRAVENOUS at 09:29

## 2021-11-11 RX ADMIN — SODIUM CHLORIDE, SODIUM LACTATE, POTASSIUM CHLORIDE, AND CALCIUM CHLORIDE: .6; .31; .03; .02 INJECTION, SOLUTION INTRAVENOUS at 10:10

## 2021-11-11 ASSESSMENT — PULMONARY FUNCTION TESTS
PIF_VALUE: 1
PIF_VALUE: 1
PIF_VALUE: 0
PIF_VALUE: 1
PIF_VALUE: 0
PIF_VALUE: 1
PIF_VALUE: 0
PIF_VALUE: 1
PIF_VALUE: 0
PIF_VALUE: 0
PIF_VALUE: 1
PIF_VALUE: 0
PIF_VALUE: 0
PIF_VALUE: 1
PIF_VALUE: 0
PIF_VALUE: 1
PIF_VALUE: 0
PIF_VALUE: 1
PIF_VALUE: 1
PIF_VALUE: 0
PIF_VALUE: 1
PIF_VALUE: 0
PIF_VALUE: 1
PIF_VALUE: 0
PIF_VALUE: 1
PIF_VALUE: 1
PIF_VALUE: 0

## 2021-11-11 ASSESSMENT — PAIN DESCRIPTION - DESCRIPTORS: DESCRIPTORS: ACHING

## 2021-11-11 ASSESSMENT — PAIN - FUNCTIONAL ASSESSMENT: PAIN_FUNCTIONAL_ASSESSMENT: 0-10

## 2021-11-11 NOTE — PROGRESS NOTES
Pt was irritable and upset on first confrontation. ... voiced concerns about his cerebral Palsy and trouble moving, then got prostate cancer stage 3 and now is incont of urine. Provided listening and reasurrance we could deal with the incontinence. No problem. Pt calmed down within 10 minutes.

## 2021-11-11 NOTE — OP NOTE
EGD and Colonoscopy Procedure  Note            Patient: Mirian Zuleta  : 1960  CSN: [id number]    Procedure: Esophagogastroduodenoscopy with biopsy    Date:  2021     Surgeon: Latesha De Jesus MD     Referring Physician:  Lianne Pillai MD    Preoperative Diagnosis:  History of colon polyps [Z86.010] Hinojosa's esophagus without dysplasia [K22.70]    Postoperative Diagnosis:  * No post-op diagnosis entered *    Anesthesia:  MAC    EBL: minimal to none    Indications: This is a 59y.o. year old male who presents today with GERD, BArretts, h/o colon polyps. Procedure Details: The Olympus video endoscope was passed through the hypopharynx into the esophagus. The scope was advanced all the way to the second portion of the duodenum. The GE junction was at approximately 38 cms. The scope was slowly withdrawn and mucosa was carefully evaluated including a retroflex view of the proximal stomach. Findings and interventions are described below. The patient was decompressed and the scope was then withdrawn. Gastric or Duodenal ulcer present: No    Procedure Details:    With the patient in left lateral decubitus position a digital rectal examination was performed. The Olympus video colonoscope was inserted through the anorectal area into the rectum. The scope was then advanced through the length of the colon to the terminal ileum. The quality of preparation was fair after extensive washing. The scope was then slowly withdrawn with careful inspection of the mucosa including retroflexion in the rectum. Findings and interventions are described below. The patient was decompressed. The scope was then withdrawn. The patient was taken to recovery in good condition. There were no immediate complications. Impression:   -  1. Barretts esophagus extending from 38- 35 cm. [place] criteria c2m3. Bxs taken in a 4 quadrant fashion to rule our dysplasia.   2.  GERD with linear erosions extending 2 cm above the GE junction- LA class A.  3.  5 cm hiatal hernia  4. Linear antritis- bx'd to rule out H. Pylori using the [place] protocol. 5.  Rare scattered diverticulosis  6.  (2) 4 mm rectosigmoid sessile polyps- cold snared. 7.  Small hemorrhoids  8. Fair prep    Recommendations:   -Continue acid suppression with PPI. , -Await pathology results, lifestyle modifications for GERD. Repeat EGD 3 years. Repeat colonoscopy 5 years. Efrain aCstle MD, MD   [facility]  11/11/2021    Please note that some or all of this record was generated using voice recognition software. If there are any questions about the content of this document, please contact the author as some errors in translation may have occurred.

## 2021-11-11 NOTE — ANESTHESIA POSTPROCEDURE EVALUATION
Department of Anesthesiology  Postprocedure Note    Patient: Robin Nguyễn  MRN: [id number]  YOB: 1960  Date of evaluation: 11/11/2021  Time:  2:27 PM     Procedure Summary     Date: 11/11/21 Room / Location: [address] [place] [address] [facility]    Anesthesia Start: 9685 Anesthesia Stop: 1114    Procedures:       COLONOSCOPY POLYPECTOMY SNARE/COLD BIOPSY (N/A )      EGD BIOPSY (N/A ) Diagnosis:       History of colon polyps      Hinojosa's esophagus without dysplasia      (History of colon polyps [Z86.010] Hinojosa's esophagus without dysplasia [K22.70])    Surgeons: Raúl Sanchez MD Responsible Provider: Shimon Silveira MD    Anesthesia Type: general, MAC ASA Status: 3          Anesthesia Type: general, MAC    Clemente Phase I: Clemente Score: 10    Clemente Phase II: Clemente Score: 10    Last vitals: Reviewed and per EMR flowsheets.        Anesthesia Post Evaluation    Patient participation: complete - patient participated  Level of consciousness: awake  Airway patency: patent  Nausea & Vomiting: no nausea and no vomiting  Complications: no  Cardiovascular status: hemodynamically stable  Respiratory status: acceptable  Hydration status: stable

## 2021-11-11 NOTE — ANESTHESIA PRE PROCEDURE
Department of Anesthesiology  Preprocedure Note       Name:  Cami Egan   Age:  59 y.o.  :  1960                                          MRN:  [id number]         Date:  2021      Surgeon: Joseline Tejeda):  Margo Daily MD    Procedure: Procedure(s):  ESOPHAGOGASTRODUODENOSCOPY  COLONOSCOPY    Medications prior to admission:   Prior to Admission medications    Medication Sig Start Date End Date Taking? Authorizing Provider   diclofenac sodium 1 % GEL APPLY 2 GRAMS TO THE AFFECTED AREAS 4 TIMES A DAY 19  Yes Historical Provider, MD   MAGNESIUM PO Take by mouth    Historical Provider, MD   Menaquinone-7 (VITAMIN K2 PO) Take by mouth    Historical Provider, MD   OLIVE LEAF EXTRACT PO Take by mouth    Historical Provider, MD   Cholecalciferol (VITAMIN D3) 75 MCG (3000 UT) TABS Take by mouth    Historical Provider, MD   dicyclomine (BENTYL) 10 MG capsule Take 10 mg by mouth 3 times daily 20   Historical Provider, MD   naproxen (NAPROSYN) 500 MG tablet Take 500 mg by mouth 2 times daily (with meals) 20   Historical Provider, MD   glucosamine-chondroitin 500-400 MG CAPS Take 1 capsule by mouth    Historical Provider, MD   methocarbamol (ROBAXIN) 750 MG tablet Take 750 mg by mouth as needed  19   Historical Provider, MD   omeprazole (PRILOSEC) 20 MG delayed release capsule TAKE 1 CAPSULE BY MOUTH EVERY DAY IN THE MORNING BEFORE BREAKFAST 19   Historical Provider, MD       Current medications:    Current Facility-Administered Medications   Medication Dose Route Frequency Provider Last Rate Last Admin    lactated ringers infusion   IntraVENous Continuous Elba Belts, DO           Allergies:     Allergies   Allergen Reactions    Clindamycin Rash    Doxycycline Rash    Prednisone Other (See Comments)     Pt stated his throat was itching and felt abnormal       Problem List:    Patient Active Problem List   Diagnosis Code    Prostate cancer ([facility] [address] C61    GERD (gastroesophageal

## 2021-11-11 NOTE — H&P
Gastroenterology Note             Pre-operative History and Physical    Patient: Ezequiel Wells  : 1960  CSN: [id number]    History Obtained From:  patient and/or guardian. HISTORY OF PRESENT ILLNESS:    The patient is a 59 y.o. male  here for GERD, Barretts, h/o polyps. Past Medical History:    Past Medical History:   Diagnosis Date    Arthritis     KNEES    Hinojosa esophagus     Cancer ([address]     PROSTATE, AND THROAT    Cerebral palsy ([address]     some trouble walking - uses a walker    Diabetes mellitus ([address]     PRE-DIABETIC    GERD (gastroesophageal reflux disease)     Hyperlipidemia     IBS (irritable bowel syndrome)     Incontinence of urine 2020    since Prostate surgery    [facility] cerebral [facility]y ([facility] [address]     Lisping     r/t cerebral palsy    MRSA (methicillin resistant staph aureus) culture positive     5 YRS AGO IN  ARM RT ARM    PONV (postoperative nausea and vomiting)     as child \"ether     Past Surgical History:    Past Surgical History:   Procedure Laterality Date    ACHILLES TENDON SURGERY      COLONOSCOPY      ENDOSCOPY, COLON, DIAGNOSTIC      HAND SURGERY      LEFT HAND TUMOR REMOVED    KNEE SURGERY Bilateral     PROSTATECTOMY N/A 2021    DAVINCI ROBOTIC PROSTATECTOMY performed by Yosi Ellison MD at [address]       Medications Prior to Admission:   No current facility-administered medications on file prior to encounter.      Current Outpatient Medications on File Prior to Encounter   Medication Sig Dispense Refill    diclofenac sodium 1 % GEL APPLY 2 GRAMS TO THE AFFECTED AREAS 4 TIMES A DAY  2    MAGNESIUM PO Take by mouth      Menaquinone-7 (VITAMIN K2 PO) Take by mouth      OLIVE LEAF EXTRACT PO Take by mouth      Cholecalciferol (VITAMIN D3) 75 MCG (3000 UT) TABS Take by mouth      dicyclomine (BENTYL) 10 MG capsule Take 10 mg by mouth 3 times daily      naproxen (NAPROSYN) 500 MG tablet Take 500 mg by mouth 2

## 2021-11-16 ENCOUNTER — OFFICE VISIT (OUTPATIENT)
Dept: PRIMARY CARE CLINIC | Age: 61
End: 2021-11-16
Payer: COMMERCIAL

## 2021-11-16 VITALS
HEART RATE: 76 BPM | RESPIRATION RATE: 18 BRPM | TEMPERATURE: 97.2 F | WEIGHT: 167 LBS | DIASTOLIC BLOOD PRESSURE: 90 MMHG | BODY MASS INDEX: 26.84 KG/M2 | SYSTOLIC BLOOD PRESSURE: 126 MMHG | HEIGHT: 66 IN | OXYGEN SATURATION: 97 %

## 2021-11-16 DIAGNOSIS — E78.00 PURE HYPERCHOLESTEROLEMIA: Primary | ICD-10-CM

## 2021-11-16 DIAGNOSIS — R03.0 ELEVATED BLOOD-PRESSURE READING WITHOUT DIAGNOSIS OF HYPERTENSION: ICD-10-CM

## 2021-11-16 PROCEDURE — G8419 CALC BMI OUT NRM PARAM NOF/U: HCPCS | Performed by: FAMILY MEDICINE

## 2021-11-16 PROCEDURE — G8427 DOCREV CUR MEDS BY ELIG CLIN: HCPCS | Performed by: FAMILY MEDICINE

## 2021-11-16 PROCEDURE — 36415 COLL VENOUS BLD VENIPUNCTURE: CPT | Performed by: FAMILY MEDICINE

## 2021-11-16 PROCEDURE — G8484 FLU IMMUNIZE NO ADMIN: HCPCS | Performed by: FAMILY MEDICINE

## 2021-11-16 PROCEDURE — 99214 OFFICE O/P EST MOD 30 MIN: CPT | Performed by: FAMILY MEDICINE

## 2021-11-16 PROCEDURE — 3017F COLORECTAL CA SCREEN DOC REV: CPT | Performed by: FAMILY MEDICINE

## 2021-11-16 PROCEDURE — 1036F TOBACCO NON-USER: CPT | Performed by: FAMILY MEDICINE

## 2021-11-16 NOTE — PROGRESS NOTES
Blood pressure        PROGRESS NOTE  Date of Service:  11/16/2021    SUBJECTIVE:  Patient ID: Jesus Menendez is a 59 y.o. male    HPI:   Patient was diagnosed with cerebral palsy as a child and has had many physical manifestations. He continues to have difficulties with knee pain and ambulation and is using a mobility scooter at this time. He has been evaluated by orthopedics who has given him injections in his knee however he has not had significant relief. He does deal with contractures of his lower legs bilaterally for which he uses Robaxin on occasion as well as naproxen as needed    Prediabeteshas had elevated blood sugar in the past; most recent fasting blood sugar 75. Hemoglobin A1c 5.5 at that time  Denies increased thirst or urination. Hyperlipidemia no current medications, has used niacin in the past  The 10-year ASCVD risk score (Yani Barbosa, et al., 2013) is: 12.4%    Values used to calculate the score:      Age: 59 years      Sex: Male      Is Non- : No      Diabetic: No      Tobacco smoker: No      Systolic Blood Pressure: 846 mmHg      Is BP treated: No      HDL Cholesterol: 38 mg/dL      Total Cholesterol: 182 mg/dL    Elevated blood pressuredenies increased blood pressure on a regular basis and is not on medication for this    He was diagnosed with prostate cancer and had a prostatectomy 1/21/2021. Unfortunately he has had complications of incontinence and decreased sexual ability since that time. He follows with Dr. Chris Adkins urology    Hinojosa's esophaguson recent endoscopy. Continues proton pump inhibitor    He reports since his endoscopy with bowel prep he has had extreme thirst and mouth dryness. He is worried about dehydration      Patient's medications, allergies, past medical, surgical, social and family histories were reviewed and updated as appropriate. Review of Systems   All other systems reviewed and are negative.       OBJECTIVE:  Vitals: 11/16/21 1601   BP: (!) 126/90   Site: Right Upper Arm   Position: Sitting   Cuff Size: Large Adult   Pulse: 76   Resp: 18   Temp: 97.2 °F (36.2 °C)   TempSrc: Temporal   SpO2: 97%   Weight: 167 lb (75.8 kg)   Height: 5' 6\" (1.676 m)      Body mass index is 26.95 kg/m². Physical Exam  Vitals reviewed. HENT:      Head: Normocephalic and atraumatic. Eyes:      General: No scleral icterus. Conjunctiva/sclera: Conjunctivae normal.   Cardiovascular:      Rate and Rhythm: Normal rate and regular rhythm. Heart sounds: Normal heart sounds. Psychiatric:         Mood and Affect: Mood normal.         Behavior: Behavior normal.         Thought Content: Thought content normal.         Judgment: Judgment normal.         ASSESSMENT:  1. Pure hypercholesterolemia    2. Elevated blood-pressure reading without diagnosis of hypertension         PLAN:   1. Pure hypercholesterolemia  Do recommend statin medication. To assist in this decision making process I ordered a cardiac calcium score  - CT CARDIAC CALCIUM SCORING; Future    2. Elevated blood-pressure reading without diagnosis of hypertension  Do recommend monitoring his blood pressure on a regular basis for many more data. - Comprehensive Metabolic Panel  - Blood Pressure KIT; 1 each by Does not apply route daily Dispense according to insurance formulary  Dispense: 1 kit; Refill: 0  We will check metabolic profile today and assess for dehydration. Exam findings are reassuring but this would not be severe. Consider dry mouth and treatments there and if this is normal    Return for with testing results. Electronically signed by Richie Jean MD on 11/16/2021 at 12:52 PM.    Please note this chart was generated using dragon dictation software. Although every effort was made to ensure the accuracy of this automated transcription, some errors in transcription may have occurred.

## 2021-11-17 LAB
A/G RATIO: 1.5 (ref 1.1–2.2)
ALBUMIN SERPL-MCNC: 4.6 G/DL (ref 3.4–5)
ALP BLD-CCNC: 84 U/L (ref 40–129)
ALT SERPL-CCNC: 23 U/L (ref 10–40)
ANION GAP SERPL CALCULATED.3IONS-SCNC: 18 MMOL/L (ref 3–16)
AST SERPL-CCNC: 24 U/L (ref 15–37)
BILIRUB SERPL-MCNC: 0.7 MG/DL (ref 0–1)
BUN BLDV-MCNC: 15 MG/DL (ref 7–20)
CALCIUM SERPL-MCNC: 9.6 MG/DL (ref 8.3–10.6)
CHLORIDE BLD-SCNC: 100 MMOL/L (ref 99–110)
CO2: 20 MMOL/L (ref 21–32)
CREAT SERPL-MCNC: 0.9 MG/DL (ref 0.8–1.3)
GFR AFRICAN AMERICAN: >60
GFR NON-AFRICAN AMERICAN: >60
GLUCOSE BLD-MCNC: 75 MG/DL (ref 70–99)
POTASSIUM SERPL-SCNC: 4 MMOL/L (ref 3.5–5.1)
SODIUM BLD-SCNC: 138 MMOL/L (ref 136–145)
TOTAL PROTEIN: 7.6 G/DL (ref 6.4–8.2)

## 2021-11-17 RX ORDER — BLOOD PRESSURE TEST KIT
1 KIT MISCELLANEOUS DAILY
Qty: 1 KIT | Refills: 0 | Status: SHIPPED | OUTPATIENT
Start: 2021-11-17

## 2021-11-19 ENCOUNTER — TELEPHONE (OUTPATIENT)
Dept: PRIMARY CARE CLINIC | Age: 61
End: 2021-11-19

## 2021-11-19 NOTE — TELEPHONE ENCOUNTER
----- Message from Alethea Talavera sent at 11/19/2021  9:49 AM EST -----  Subject: Message to Provider    QUESTIONS  Information for Provider? please call pt to discuss his concerns. he has   cerebral palsy and wants dr to know that his numbers are normal for him. Would like to speak with Dr Sugar Vasquez directly. He is not sure where the   disconnect is with him and his condition.   ---------------------------------------------------------------------------  --------------  CALL BACK INFO  What is the best way for the office to contact you? OK to leave message on   voicemail  Preferred Call Back Phone Number? [phone]  ---------------------------------------------------------------------------  --------------  SCRIPT ANSWERS  Relationship to Patient?  Self

## 2021-11-19 NOTE — TELEPHONE ENCOUNTER
I called patient and left a message stating that Dr. Ailyn Guerrero is out of office today and will be returning on Monday.

## 2021-11-22 ENCOUNTER — HOSPITAL ENCOUNTER (OUTPATIENT)
Dept: CT IMAGING | Age: 61
Discharge: HOME | End: 2021-11-22
Payer: COMMERCIAL

## 2021-11-22 DIAGNOSIS — E78.00 PURE HYPERCHOLESTEROLEMIA: ICD-10-CM

## 2021-11-22 PROCEDURE — 75571 CT HRT W/O DYE W/CA TEST: CPT

## 2022-01-24 ENCOUNTER — OFFICE VISIT (OUTPATIENT)
Dept: PRIMARY CARE CLINIC | Age: 62
End: 2022-01-24

## 2022-01-24 VITALS
TEMPERATURE: 97.1 F | SYSTOLIC BLOOD PRESSURE: 126 MMHG | WEIGHT: 164 LBS | HEIGHT: 66 IN | DIASTOLIC BLOOD PRESSURE: 88 MMHG | HEART RATE: 92 BPM | BODY MASS INDEX: 26.36 KG/M2 | OXYGEN SATURATION: 98 %

## 2022-01-24 DIAGNOSIS — E78.00 PURE HYPERCHOLESTEROLEMIA: Primary | ICD-10-CM

## 2022-01-24 DIAGNOSIS — R03.0 ELEVATED BLOOD-PRESSURE READING WITHOUT DIAGNOSIS OF HYPERTENSION: ICD-10-CM

## 2022-01-24 PROBLEM — C61 PROSTATE CANCER (HCC): Status: RESOLVED | Noted: 2021-01-21 | Resolved: 2022-01-24

## 2022-01-24 PROCEDURE — 99999 PR OFFICE/OUTPT VISIT,PROCEDURE ONLY: CPT | Performed by: FAMILY MEDICINE

## 2022-01-24 NOTE — PROGRESS NOTES
PROGRESS NOTE  Date of Service:  1/24/2022    SUBJECTIVE:  Patient ID: Juancho Rosas is a 59 y.o. male    HPI:       Patient was diagnosed with cerebral palsy as a child and has had many physical manifestations. He continues to have difficulties with knee pain and ambulation and is using a mobility scooter at this time. He has been evaluated by orthopedics who has given him injections in his knee however he has not had significant relief. He does deal with contractures of his lower legs bilaterally and is recently seen physical medicine rehab    Prediabeteshas had elevated blood sugar in the past; most recent fasting blood sugar 75. Hemoglobin A1c 5.5 at that time  Denies increased thirst or urination. Hyperlipidemia no current medications, has used niacin in the past  cac score 0 11/2021    Elevated blood pressuredenies increased blood pressure on a regular basis     He was diagnosed with prostate cancer and had a prostatectomy 1/21/2021. Unfortunately he has had complications of incontinence and decreased sexual ability since that time. He follows with Dr. Pk Cano urology    Hinojosa's esophaguson recent endoscopy. Continues proton pump inhibitor      Patient's medications, allergies, past medical, surgical, social and family histories were reviewed and updated as appropriate. OBJECTIVE:  Vitals:    01/24/22 1259   BP: 126/88   Site: Left Upper Arm   Position: Sitting   Cuff Size: Medium Adult   Pulse: 92   Temp: 97.1 °F (36.2 °C)   TempSrc: Temporal   SpO2: 98%   Weight: 164 lb (74.4 kg)   Height: 5' 6\" (1.676 m)      Body mass index is 26.47 kg/m². Physical Exam  Psychiatric:         Mood and Affect: Affect is angry. Behavior: Behavior is uncooperative and aggressive. ASSESSMENT:  1. Pure hypercholesterolemia    2. Elevated blood-pressure reading without diagnosis of hypertension         PLAN:   1.  Pure hypercholesterolemia  Discussed with patient that a coronary calcium score of 0 and no medications for cholesterolemia he is not in need of every 3-month cholesterol labs. 2. Elevated blood-pressure reading without diagnosis of hypertension  Blood pressure increased today due to an interaction he had with another  on his way to our appointment. No follow-ups on file. Electronically signed by Mohamud Stallings MD on 1/24/2022 at 1:32 PM.    Please note this chart was generated using dragon dictation software. Although every effort was made to ensure the accuracy of this automated transcription, some errors in transcription may have occurred.

## 2022-01-24 NOTE — LETTER
[facility]  [address]  Phone: [phone]  Fax: [phone]    1/24/2022    Kelli Stoll  1960  [address] [place] [address]      Dear Wang Simpson,    I regret to inform you that the provider(s) at [facility] will no longer be able to provide your medical care. I recommend that you immediately find a new provider. We will continue to care for your urgent medical problems until 30 days from the date of this letter. We will also provide prescriptions for your current medications based on your past refill history to ensure you will have sufficient medications to last during the 30 days from the date of this letter. To obtain your medical records, please contact our Medical Records department. You may contact your insurance provider, managed care organization (if applicable) or local Medical Society to obtain a current listing of providers available to provide care. I urge you to see a new provider quickly so that there will be no interruption of your care. I wish you the best in your future medical care. Sincerely,        JANKI Hartman., A.M.T. Practice Manager,  [facility]  [address] [place] [address]  P: [phone]  F: [phone]

## 2022-01-24 NOTE — PROGRESS NOTES
Per recommendation from Dr. Edith Park -     Due to a break down in communication with provider, the patient has been dismissed from [facility]. Letter sent to patient today. PREETI Renteria, A.M.T. Practice Manager,  [facility]) [facility]    [address]  [place]  P: [phone]  F: [phone]    [facility]  [facility]  [address] [facility] [address]  [facility], [address] [facility]  P: [phone]  F: [phone]

## 2023-08-11 ENCOUNTER — TELEPHONE (OUTPATIENT)
Dept: ORTHOPEDIC SURGERY | Age: 63
End: 2023-08-11

## (undated) DEVICE — SUTURE VCRL SZ 0 L27IN ABSRB UD L36MM CT-1 1/2 CIR J260H

## (undated) DEVICE — TROCAR: Brand: KII OPTICAL ACCESS SYSTEM

## (undated) DEVICE — JELLY,LUBE,STERILE,FLIP TOP,TUBE,2-OZ: Brand: MEDLINE

## (undated) DEVICE — SURE SET-DOUBLE BASIN-LF: Brand: MEDLINE INDUSTRIES, INC.

## (undated) DEVICE — LEGGINGS, PAIR, 33X51 XL, STERILE: Brand: MEDLINE

## (undated) DEVICE — NEEDLE HYPO 22GA L1.5IN BLK POLYPR HUB S STL REG BVL STR

## (undated) DEVICE — SOLUTION IV IRRIG WATER 1000ML POUR BRL 2F7114

## (undated) DEVICE — TISSUE RETRIEVAL SYSTEM: Brand: INZII RETRIEVAL SYSTEM

## (undated) DEVICE — SPONGE,LAP,18"X18",DLX,XR,ST,5/PK,40/PK: Brand: MEDLINE

## (undated) DEVICE — SURGICAL SET UP - SURE SET: Brand: MEDLINE INDUSTRIES, INC.

## (undated) DEVICE — SOLUTION IV 1000ML 0.9% SOD CHL

## (undated) DEVICE — 60 ML SYRINGE,CATHETER TIP: Brand: MONOJECT

## (undated) DEVICE — TRAY PREP DRY W/ PREM GLV 2 APPL 6 SPNG 2 UNDPD 1 OVERWRAP

## (undated) DEVICE — MARYLAND BIPOLAR FORCEPS: Brand: ENDOWRIST

## (undated) DEVICE — APPLICATOR MEDICATED 26 CC SOLUTION HI LT ORNG CHLORAPREP

## (undated) DEVICE — TRAY CATHETER 16FR F INCLUDE BARDX IC COMPLT CARE URIN M STATLOK

## (undated) DEVICE — SYSTEM SMK EVAC LAP TBNG FILTER HSNG BENT STYL PNK SEE CLR

## (undated) DEVICE — TRAP SPEC RETRV CLR PLAS POLYP IN LN SUCT QUIK CTCH

## (undated) DEVICE — Device

## (undated) DEVICE — PLATE ES AD W 9FT CRD 2

## (undated) DEVICE — PUMP SUC IRR TBNG L10FT W/ HNDPC ASSEMB STRYKEFLOW 2

## (undated) DEVICE — JEWISH HOSPITAL TURNOVER KIT: Brand: MEDLINE INDUSTRIES, INC.

## (undated) DEVICE — NEEDLE INSUF L150MM DIA2MM DISP FOR PNEUMOPERI ENDOPATH

## (undated) DEVICE — 40583 XL ADVANCED TRENDELENBURG POSITIONING KIT: Brand: 40583 XL ADVANCED TRENDELENBURG POSITIONING KIT

## (undated) DEVICE — ADHESIVE SKIN CLSR 0.7ML TOP DERMBND ADV

## (undated) DEVICE — FORCEPS BX L240CM JAW DIA2.4MM ORNG L CAP W/ NDL DISP RAD

## (undated) DEVICE — BLADELESS OBTURATOR: Brand: WECK VISTA

## (undated) DEVICE — SUTURE VCRL SZ 3-0 L27IN ABSRB UD L17MM RB-1 1/2 CIR J215H

## (undated) DEVICE — CANNULA SAMP CO2 AD GRN 7FT CO2 AND 7FT O2 TBNG UNIV CONN

## (undated) DEVICE — SUTURE VCRL SZ 3-0 L27IN ABSRB UD L26MM SH 1/2 CIR J416H

## (undated) DEVICE — SUTURE PERMA-HAND SZ 2-0 L30IN NONABSORBABLE BLK L30MM FSL 679H

## (undated) DEVICE — ARM DRAPE

## (undated) DEVICE — SINGLE-USE POLYPECTOMY SNARE: Brand: CAPTIVATOR

## (undated) DEVICE — SUTURE STRATAFIX SPRL PDS + SZ 3 0 L6IN ABSRB VLT RB 1 L17MM SXPP1B422

## (undated) DEVICE — SOLUTION ANTIFOG VIS SYS CLEARIFY LAPSCP

## (undated) DEVICE — CANNULA SEAL

## (undated) DEVICE — SUTURE MCRYL SZ 4-0 L27IN ABSRB UD L19MM PS-2 1/2 CIR PRIM Y426H

## (undated) DEVICE — SYRINGE MED 10ML LUERLOCK TIP W/O SFTY DISP

## (undated) DEVICE — 3M™ IOBAN™ 2 ANTIMICROBIAL INCISE DRAPE 6648EZ: Brand: IOBAN™ 2

## (undated) DEVICE — LARGE NEEDLE DRIVER: Brand: ENDOWRIST

## (undated) DEVICE — DRAPE,LAP,CHOLE,W/TROUGHS,STERILE: Brand: MEDLINE

## (undated) DEVICE — SCISSORS SURG DIA8MM MPLR CRV ENDOWRIST

## (undated) DEVICE — PROGRASP FORCEPS: Brand: ENDOWRIST

## (undated) DEVICE — TIP COVER ACCESSORY

## (undated) DEVICE — GLOVE ORANGE PI 7   MSG9070

## (undated) DEVICE — SUTURE MCRYL SZ 2-0 L27IN ABSRB VLT CT-1 L36MM 1/2 CIR TAPR Y339H

## (undated) DEVICE — ELECTROSURGICAL PENCIL ROCKER SWITCH NON COATED BLADE ELECTRODE 10 FT (3 M) CORD HOLSTER: Brand: MEGADYNE

## (undated) DEVICE — SUTURE NRLN SZ 0 L18IN NONABSORBABLE BLK L26MM CT-2 1/2 CIR C527D

## (undated) DEVICE — DRAPE,UNDERBUTTOCKS,PCH,STERILE: Brand: MEDLINE

## (undated) DEVICE — GOWN,SIRUS,POLYRNF,BRTHSLV,LG,30/CS: Brand: MEDLINE

## (undated) DEVICE — GARMENT,MEDLINE,DVT,INT,CALF,MED, GEN2: Brand: MEDLINE

## (undated) DEVICE — LAPAROSCOPIC SCISSORS: Brand: EPIX LAPAROSCOPIC SCISSORS

## (undated) DEVICE — [HIGH FLOW INSUFFLATOR,  DO NOT USE IF PACKAGE IS DAMAGED,  KEEP DRY,  KEEP AWAY FROM SUNLIGHT,  PROTECT FROM HEAT AND RADIOACTIVE SOURCES.]: Brand: PNEUMOSURE

## (undated) DEVICE — BLANKET WRM W29.9XL79.1IN UP BODY FORC AIR MISTRAL-AIR